# Patient Record
Sex: FEMALE | Race: ASIAN | NOT HISPANIC OR LATINO
[De-identification: names, ages, dates, MRNs, and addresses within clinical notes are randomized per-mention and may not be internally consistent; named-entity substitution may affect disease eponyms.]

---

## 2018-05-30 PROBLEM — Z00.00 ENCOUNTER FOR PREVENTIVE HEALTH EXAMINATION: Status: ACTIVE | Noted: 2018-05-30

## 2018-06-04 ENCOUNTER — APPOINTMENT (OUTPATIENT)
Dept: OBGYN | Facility: HOSPITAL | Age: 28
End: 2018-06-04

## 2018-06-08 ENCOUNTER — APPOINTMENT (OUTPATIENT)
Dept: INTERNAL MEDICINE | Facility: CLINIC | Age: 28
End: 2018-06-08

## 2019-03-24 ENCOUNTER — EMERGENCY (EMERGENCY)
Facility: HOSPITAL | Age: 29
LOS: 0 days | Discharge: HOME | End: 2019-03-24
Admitting: PHYSICIAN ASSISTANT

## 2019-03-24 VITALS
HEART RATE: 89 BPM | OXYGEN SATURATION: 99 % | RESPIRATION RATE: 18 BRPM | DIASTOLIC BLOOD PRESSURE: 64 MMHG | TEMPERATURE: 97 F | SYSTOLIC BLOOD PRESSURE: 117 MMHG

## 2019-03-24 VITALS
OXYGEN SATURATION: 99 % | HEART RATE: 75 BPM | DIASTOLIC BLOOD PRESSURE: 69 MMHG | RESPIRATION RATE: 18 BRPM | TEMPERATURE: 98 F | SYSTOLIC BLOOD PRESSURE: 112 MMHG

## 2019-03-24 DIAGNOSIS — R05 COUGH: ICD-10-CM

## 2019-03-24 DIAGNOSIS — J40 BRONCHITIS, NOT SPECIFIED AS ACUTE OR CHRONIC: ICD-10-CM

## 2019-03-24 RX ORDER — CLARITHROMYCIN 500 MG
1 TABLET ORAL
Qty: 1 | Refills: 0 | OUTPATIENT
Start: 2019-03-24 | End: 2019-03-28

## 2019-03-24 RX ORDER — ALBUTEROL 90 UG/1
1 AEROSOL, METERED ORAL ONCE
Qty: 0 | Refills: 0 | Status: COMPLETED | OUTPATIENT
Start: 2019-03-24 | End: 2019-03-24

## 2019-03-24 RX ADMIN — ALBUTEROL 1 PUFF(S): 90 AEROSOL, METERED ORAL at 19:50

## 2019-03-24 NOTE — ED PROVIDER NOTE - NSFOLLOWUPCLINICS_GEN_ALL_ED_FT
[facility]  Medicine  [address], [place]   Phone: [phone]  Fax:   Follow Up Time:

## 2019-03-24 NOTE — ED PROVIDER NOTE - OBJECTIVE STATEMENT
Patient c/o cough for 2 weeks, Had flu and now better. Cough continues, Dry , Feels some wheezing in left side. no fever, no SOB.

## 2019-03-24 NOTE — ED ADULT NURSE NOTE - OBJECTIVE STATEMENT
Patient was treated for flu 2 weeks ago. Coughing and congestion persist. Patient denies Fevers chills N/V/D adnominal pain and urinary symptoms.

## 2019-03-24 NOTE — ED PROVIDER NOTE - CLINICAL SUMMARY MEDICAL DECISION MAKING FREE TEXT BOX
Slight rhonchi left base with CXR showing atelectasis. No Fever, No SOB, Patient DC with MDI albuterol to use Q6 and Z pack

## 2019-03-24 NOTE — ED PROVIDER NOTE - NSFOLLOWUPINSTRUCTIONS_ED_ALL_ED_FT
Acute Bronchitis    Bronchitis is inflammation of the airways that extend from the windpipe into the lungs (bronchi). The inflammation often causes mucus to develop. This leads to a cough, which is the most common symptom of bronchitis.     In acute bronchitis, the condition usually develops suddenly and goes away over time, usually in a couple weeks. Smoking, allergies, and asthma can make bronchitis worse. Repeated episodes of bronchitis may cause further lung problems.     CAUSES  Acute bronchitis is most often caused by the same virus that causes a cold. The virus can spread from person to person (contagious) through coughing, sneezing, and touching contaminated objects.    SIGNS AND SYMPTOMS  Cough.    Fever.    Coughing up mucus.    Body aches.    Chest congestion.    Chills.    Shortness of breath.    Sore throat.      DIAGNOSIS  Acute bronchitis is usually diagnosed through a physical exam. Your health care provider will also ask you questions about your medical history. Tests, such as chest X-rays, are sometimes done to rule out other conditions.     TREATMENT  Acute bronchitis usually goes away in a couple weeks. Oftentimes, no medical treatment is necessary. Medicines are sometimes given for relief of fever or cough. Antibiotic medicines are usually not needed but may be prescribed in certain situations. In some cases, an inhaler may be recommended to help reduce shortness of breath and control the cough. A cool mist vaporizer may also be used to help thin bronchial secretions and make it easier to clear the chest.     HOME CARE INSTRUCTIONS  Get plenty of rest.    Drink enough fluids to keep your urine clear or pale yellow (unless you have a medical condition that requires fluid restriction). Increasing fluids may help thin your respiratory secretions (sputum) and reduce chest congestion, and it will prevent dehydration.    Take medicines only as directed by your health care provider.   If you were prescribed an antibiotic medicine, finish it all even if you start to feel better.   Avoid smoking and secondhand smoke. Exposure to cigarette smoke or irritating chemicals will make bronchitis worse. If you are a smoker, consider using nicotine gum or skin patches to help control withdrawal symptoms. Quitting smoking will help your lungs heal faster.    Reduce the chances of another bout of acute bronchitis by washing your hands frequently, avoiding people with cold symptoms, and trying not to touch your hands to your mouth, nose, or eyes.    Keep all follow-up visits as directed by your health care provider.      SEEK MEDICAL CARE IF:  Your symptoms do not improve after 1 week of treatment.     SEEK IMMEDIATE MEDICAL CARE IF:  You develop an increased fever or chills.    You have chest pain.    You have severe shortness of breath.  You have bloody sputum.     You develop dehydration.  You faint or repeatedly feel like you are going to pass out.  You develop repeated vomiting.  You develop a severe headache.     MAKE SURE YOU:  Understand these instructions.   Will watch your condition.  Will get help right away if you are not doing well or get worse.    ADDITIONAL NOTES AND INSTRUCTIONS    Please follow up with your Primary MD in 24-48 hr.  Seek immediate medical care for any new/worsening signs or symptoms.     Document Released: 1/25/2006 Document Revised: 1/8/2016 Document Reviewed: 6/10/2014  [employer] Interactive Patient Education ©2016 [employer] This information is not intended to replace advice given to you by your health care provider. Make sure you discuss any questions you have with your health care provider.

## 2019-03-26 ENCOUNTER — INPATIENT (INPATIENT)
Facility: HOSPITAL | Age: 29
LOS: 0 days | Discharge: HOME | End: 2019-03-27
Attending: HOSPITALIST | Admitting: HOSPITALIST

## 2019-03-26 VITALS
TEMPERATURE: 98 F | OXYGEN SATURATION: 100 % | HEART RATE: 87 BPM | SYSTOLIC BLOOD PRESSURE: 123 MMHG | DIASTOLIC BLOOD PRESSURE: 67 MMHG | RESPIRATION RATE: 16 BRPM

## 2019-03-26 LAB
ALBUMIN SERPL ELPH-MCNC: 4.3 G/DL — SIGNIFICANT CHANGE UP (ref 3.5–5.2)
ALP SERPL-CCNC: 54 U/L — SIGNIFICANT CHANGE UP (ref 30–115)
ALT FLD-CCNC: 16 U/L — SIGNIFICANT CHANGE UP (ref 0–41)
ANION GAP SERPL CALC-SCNC: 12 MMOL/L — SIGNIFICANT CHANGE UP (ref 7–14)
APPEARANCE UR: CLEAR — SIGNIFICANT CHANGE UP
AST SERPL-CCNC: 24 U/L — SIGNIFICANT CHANGE UP (ref 0–41)
BACTERIA # UR AUTO: ABNORMAL /HPF
BASOPHILS # BLD AUTO: 0.01 K/UL — SIGNIFICANT CHANGE UP (ref 0–0.2)
BASOPHILS NFR BLD AUTO: 0.2 % — SIGNIFICANT CHANGE UP (ref 0–1)
BILIRUB SERPL-MCNC: 0.3 MG/DL — SIGNIFICANT CHANGE UP (ref 0.2–1.2)
BILIRUB UR-MCNC: NEGATIVE — SIGNIFICANT CHANGE UP
BUN SERPL-MCNC: 9 MG/DL — LOW (ref 10–20)
CALCIUM SERPL-MCNC: 9.6 MG/DL — SIGNIFICANT CHANGE UP (ref 8.5–10.1)
CHLORIDE SERPL-SCNC: 104 MMOL/L — SIGNIFICANT CHANGE UP (ref 98–110)
CO2 SERPL-SCNC: 24 MMOL/L — SIGNIFICANT CHANGE UP (ref 17–32)
COLOR SPEC: YELLOW — SIGNIFICANT CHANGE UP
CREAT SERPL-MCNC: 0.6 MG/DL — LOW (ref 0.7–1.5)
DIFF PNL FLD: ABNORMAL
EOSINOPHIL # BLD AUTO: 0.04 K/UL — SIGNIFICANT CHANGE UP (ref 0–0.7)
EOSINOPHIL NFR BLD AUTO: 0.9 % — SIGNIFICANT CHANGE UP (ref 0–8)
EPI CELLS # UR: ABNORMAL /HPF
GLUCOSE SERPL-MCNC: 88 MG/DL — SIGNIFICANT CHANGE UP (ref 70–99)
GLUCOSE UR QL: NEGATIVE MG/DL — SIGNIFICANT CHANGE UP
HCG UR QL: NEGATIVE — SIGNIFICANT CHANGE UP
HCT VFR BLD CALC: 34.5 % — LOW (ref 37–47)
HGB BLD-MCNC: 11.3 G/DL — LOW (ref 12–16)
IMM GRANULOCYTES NFR BLD AUTO: 0.2 % — SIGNIFICANT CHANGE UP (ref 0.1–0.3)
KETONES UR-MCNC: NEGATIVE — SIGNIFICANT CHANGE UP
LACTATE SERPL-SCNC: 1.1 MMOL/L — SIGNIFICANT CHANGE UP (ref 0.5–2.2)
LEUKOCYTE ESTERASE UR-ACNC: ABNORMAL
LYMPHOCYTES # BLD AUTO: 1.44 K/UL — SIGNIFICANT CHANGE UP (ref 1.2–3.4)
LYMPHOCYTES # BLD AUTO: 31.6 % — SIGNIFICANT CHANGE UP (ref 20.5–51.1)
MAGNESIUM SERPL-MCNC: 2.1 MG/DL — SIGNIFICANT CHANGE UP (ref 1.8–2.4)
MCHC RBC-ENTMCNC: 27 PG — SIGNIFICANT CHANGE UP (ref 27–31)
MCHC RBC-ENTMCNC: 32.8 G/DL — SIGNIFICANT CHANGE UP (ref 32–37)
MCV RBC AUTO: 82.3 FL — SIGNIFICANT CHANGE UP (ref 81–99)
MONOCYTES # BLD AUTO: 0.13 K/UL — SIGNIFICANT CHANGE UP (ref 0.1–0.6)
MONOCYTES NFR BLD AUTO: 2.9 % — SIGNIFICANT CHANGE UP (ref 1.7–9.3)
NEUTROPHILS # BLD AUTO: 2.93 K/UL — SIGNIFICANT CHANGE UP (ref 1.4–6.5)
NEUTROPHILS NFR BLD AUTO: 64.2 % — SIGNIFICANT CHANGE UP (ref 42.2–75.2)
NITRITE UR-MCNC: NEGATIVE — SIGNIFICANT CHANGE UP
NRBC # BLD: 0 /100 WBCS — SIGNIFICANT CHANGE UP (ref 0–0)
PH UR: 6.5 — SIGNIFICANT CHANGE UP (ref 5–8)
PLATELET # BLD AUTO: 327 K/UL — SIGNIFICANT CHANGE UP (ref 130–400)
POTASSIUM SERPL-MCNC: 4.5 MMOL/L — SIGNIFICANT CHANGE UP (ref 3.5–5)
POTASSIUM SERPL-SCNC: 4.5 MMOL/L — SIGNIFICANT CHANGE UP (ref 3.5–5)
PROT SERPL-MCNC: 8.2 G/DL — HIGH (ref 6–8)
PROT UR-MCNC: NEGATIVE MG/DL — SIGNIFICANT CHANGE UP
RBC # BLD: 4.19 M/UL — LOW (ref 4.2–5.4)
RBC # FLD: 13.7 % — SIGNIFICANT CHANGE UP (ref 11.5–14.5)
RBC CASTS # UR COMP ASSIST: ABNORMAL /HPF
SODIUM SERPL-SCNC: 140 MMOL/L — SIGNIFICANT CHANGE UP (ref 135–146)
SP GR SPEC: 1.01 — SIGNIFICANT CHANGE UP (ref 1.01–1.03)
UROBILINOGEN FLD QL: 0.2 MG/DL — SIGNIFICANT CHANGE UP (ref 0.2–0.2)
WBC # BLD: 4.56 K/UL — LOW (ref 4.8–10.8)
WBC # FLD AUTO: 4.56 K/UL — LOW (ref 4.8–10.8)
WBC UR QL: SIGNIFICANT CHANGE UP /HPF

## 2019-03-26 RX ORDER — TUBERCULIN PURIFIED PROTEIN DERIVATIVE 5 [IU]/.1ML
5 INJECTION, SOLUTION INTRADERMAL ONCE
Qty: 0 | Refills: 0 | Status: COMPLETED | OUTPATIENT
Start: 2019-03-26 | End: 2019-03-26

## 2019-03-26 RX ADMIN — TUBERCULIN PURIFIED PROTEIN DERIVATIVE 5 UNIT(S): 5 INJECTION, SOLUTION INTRADERMAL at 23:45

## 2019-03-26 NOTE — H&P ADULT - ASSESSMENT
28 F from [place], went to  for cough and was d/luciana with abx, her CXR official read came back suggestive for cavitary lesion and she was asked to go to ED to r/o TB. Denies weight loss, hemoptysis, night sweats.   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   she is koki admitted for high suspicion of TB.     1- 28 F from [place], went to  for cough and was d/luciana with abx, her CXR official read came back suggestive for cavitary lesion and she was asked to go to ED to r/o TB. Denies weight loss, hemoptysis, night sweats.   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   she is koki admitted for high suspicion of TB.     1- r/o Active TB  dry cough, no sputum  cxr- Left upper lobe cavitary lesion, left lower lobe opacity, left inferior   lateral pleural reaction, left midlung calcified granuloma    ID consult  PPD and quantiferon test  sputum cx induced  3 specimens  airborne precautions    2- DVT ppx- encouarge ambulate   reg diet  from home 28 F from [place], went to  for cough and was d/luciana with abx, her CXR official read came back suggestive for cavitary lesion and she was asked to go to ED to r/o TB. Denies weight loss, hemoptysis, night sweats.   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   she is koki admitted for high suspicion of TB.     1- r/o Active TB  dry cough, no sputum  cxr- Left upper lobe cavitary lesion, left lower lobe opacity, left inferior   lateral pleural reaction, left midlung calcified granuloma    ID consult  PPD  sputum cx induced  3 specimens  airborne precautions    2- DVT ppx- encouarge ambulate   reg diet  from home

## 2019-03-26 NOTE — H&P ADULT - NSHPPHYSICALEXAM_GEN_ALL_CORE
T(F): 98.6 (03-26-19 @ 16:42), Max: 98.6 (03-26-19 @ 16:42)  HR: 73 (03-26-19 @ 16:42) (73 - 87)  BP: 115/78 (03-26-19 @ 16:42) (115/78 - 123/67)  RR: 17 (03-26-19 @ 16:42) (16 - 17)  SpO2: 100% (03-26-19 @ 16:42) (100% - 100%)    PHYSICAL EXAM:  GEN: No acute distress  HEENT:   LUNGS: Clear to auscultation bilaterally   HEART: S1/S2 present. RRR.   ABD: Soft, non-tender, non-distended. Bowel sounds present  EXT:  NEURO: AAOX3 T(F): 98.6 (03-26-19 @ 16:42), Max: 98.6 (03-26-19 @ 16:42)  HR: 73 (03-26-19 @ 16:42) (73 - 87)  BP: 115/78 (03-26-19 @ 16:42) (115/78 - 123/67)  RR: 17 (03-26-19 @ 16:42) (16 - 17)  SpO2: 100% (03-26-19 @ 16:42) (100% - 100%)    PHYSICAL EXAM:  GEN: No acute distress  HEENT: wnl  LUNGS: Clear to auscultation bilaterally   HEART: S1/S2 present. RRR.   ABD: Soft, non-tender, non-distended.   EXT: no edema  NEURO: AAOX3

## 2019-03-26 NOTE — H&P ADULT - ATTENDING COMMENTS
Patient seen and examined independently. I agree with the resident's note, physical exam, and plan except as below.  Vital Signs Last 24 Hrs  T(C): 36.4 (27 Mar 2019 09:00), Max: 37.1 (26 Mar 2019 20:01)  T(F): 97.6 (27 Mar 2019 09:00), Max: 98.7 (26 Mar 2019 20:01)  HR: 83 (27 Mar 2019 09:00) (73 - 85)  BP: 91/52 (27 Mar 2019 09:00) (91/52 - 115/78)  BP(mean): --  RR: 18 (27 Mar 2019 09:00) (17 - 18)  SpO2: 99% (27 Mar 2019 09:00) (99% - 100%)  Pe  nad  aaox3  [id number]  ctabl  soft ntnd+bs  no cce    #dry cough - likely viral URI - +sick contact - boyfriend  - supportive treatment   #questionable left upper lobe cavitary lesion - possible scar tissue - will need outpt CT chest  no clinical signs of TB - seen by ID - Dr Jewell - agree, no PNA  stable to dc home  denies pmd - [facility] appt made apr 4th @ 1230pm for further follow up

## 2019-03-26 NOTE — ED ADULT NURSE NOTE - OBJECTIVE STATEMENT
Patient with cough x2 weeks, called back by provider for results of chest xray R/O TB. pt AA0x4, no sign sof aucte distress, no complaints at this time.

## 2019-03-26 NOTE — ED PROVIDER NOTE - CLINICAL SUMMARY MEDICAL DECISION MAKING FREE TEXT BOX
27 yo woman with very minor symptoms which seem more likely due to URI with STEPHANIE cavitary lesion of unknown duration without active TB symptoms.  But patient is relatively high risk for TB with her history and hence will be admitted for eval by ID and pulmonology.  Airborne isolation.

## 2019-03-26 NOTE — ED ADULT NURSE NOTE - NSIMPLEMENTINTERV_GEN_ALL_ED
Implemented All Universal Safety Interventions:  [place] to call system. Call bell, personal items and telephone within reach. Instruct patient to call for assistance. Room bathroom lighting operational. Non-slip footwear when patient is off stretcher. Physically safe environment: no spills, clutter or unnecessary equipment. Stretcher in lowest position, wheels locked, appropriate side rails in place.

## 2019-03-26 NOTE — ED PROVIDER NOTE - NS ED ROS FT
GENERAL: Denies fever/chills, loss of appetite/weight or fatigue  SKIN: Cristina rashes, abrasions, lacerations, ecchymosis, erythema, or edema.  HEAD: Denies headache, dizziness or trauma  EYES: Denies blurry vision, diplopia, or photophobia  ENT: Denies earaches, discharge or hearing loss. Denies nasal discharge or epistaxis. Denies sore throat.   CARDIAC: Denies chest pain, palpitations, or SOB.   RESPIRATORY: Denies SOB, hemoptysis or wheezing.   GI: Denies abdominal pain, n/v/d, bloody stools or melena.   : Denies vaginal bleeding/discharge or pelvic pain.  MSK: Denies myalgias, bony deformity or pain.   NEURO: Denies numbness, tingling, weakness.

## 2019-03-26 NOTE — ED POST DISCHARGE NOTE - RESULT SUMMARY
CXR- LEFT UPPER CAVITARY LESION NOTED. OBS MARSHA SOUTH NOTIFIED, I TOLD HER TO CALL CHARGE RN FOR ISOLATION PRECAUTION WHEN PATIENT GETS TO ED TODAY.

## 2019-03-26 NOTE — H&P ADULT - NSHPLABSRESULTS_GEN_ALL_CORE
11.3   4.56  )-----------( 327      ( 26 Mar 2019 15:00 )             34.5       03-26    140  |  104  |  9<L>  ----------------------------<  88  4.5   |  24  |  0.6<L>    Ca    9.6      26 Mar 2019 15:00  Mg     2.1     03-26    TPro  8.2<H>  /  Alb  4.3  /  TBili  0.3  /  DBili  x   /  AST  24  /  ALT  16  /  AlkPhos  54  03-26          Lactate Trend  03-26 @ 15:00 Lactate:1.1

## 2019-03-26 NOTE — ED ADULT TRIAGE NOTE - CHIEF COMPLAINT QUOTE
as per boyfriend, "she was here a couple of days ago and someone called us to come back because they saw something on her x-ray" cough x2 weeks

## 2019-03-26 NOTE — H&P ADULT - HISTORY OF PRESENT ILLNESS
28 F from [place], went to  for cough and was d/luciana with abx, her CXR official read came back suggestive for cavitary lesion and she was asked to go to ED to r/o TB. Denies weight loss, hemoptysis, night sweats.   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   she is koki admitted for high suspicion of TB. 28 F from [place], p/w  mild dry cough for 2 weeks, she went to PMD 2 weeks back and was given abx , however her cough was stable and did not improve and she came to UC and CXR official read was suggestive for cavitary lesion and she was asked to go to ED to r/o TB. Denies weight loss, hemoptysis, night sweats.   patient had h/o TB 7 years back in [place] for which she received treatment with medications for 9 months. at that time, she had severe cough and her cxr was also abnormal.     she moved to [place] 6 years back and to  3 years back. she reports that before she moved to [place] , she did have abn xray findings on cxr but was told by a doctor that she may always have some cxr abn due to h/o TB.     lives with boyfriend and her 2 year old son.

## 2019-03-26 NOTE — ED ADULT NURSE REASSESSMENT NOTE - NS ED NURSE REASSESS COMMENT FT1
received pt from previous RN; pt pending placement of ppd. respiratory notified. pt aox4, denies pain / discomfort. vs as noted. attempted to call MAR at [phone] to make aware of bp, at this time unable to respond to call. pt denies dizziness/ headache/sob.  will continue to monitor / assess

## 2019-03-26 NOTE — ED PROVIDER NOTE - OBJECTIVE STATEMENT
27 yo woman with recent URI presented to [facility] for this.  CXRAY was done and patient was D/C'ed home with reassurance and she has been taking azithromycin.  Radiology noted a STEPHANIE cavitary lesion however on official read.  Patient was called to return to ED to r/o TB.  Patient denies fever, hemoptysis, night sweats, chills, weight loss.  She has had TB about 8 years prior in [place] and was treated with 9 months of an antibiotic she does not recall the name.  She denies any history of pulmonary TB however and states that she was treated due to an abnormal PPD.  No SOB or chest pain.  She has been in this country for about 3 years but was in [place] prior to that.

## 2019-03-27 ENCOUNTER — TRANSCRIPTION ENCOUNTER (OUTPATIENT)
Age: 29
End: 2019-03-27

## 2019-03-27 VITALS
OXYGEN SATURATION: 99 % | DIASTOLIC BLOOD PRESSURE: 63 MMHG | TEMPERATURE: 98 F | HEART RATE: 72 BPM | SYSTOLIC BLOOD PRESSURE: 93 MMHG | RESPIRATION RATE: 18 BRPM

## 2019-03-27 PROBLEM — Z78.9 OTHER SPECIFIED HEALTH STATUS: Chronic | Status: INACTIVE | Noted: 2019-03-24 | Resolved: 2019-03-26

## 2019-03-27 LAB
ANION GAP SERPL CALC-SCNC: 8 MMOL/L — SIGNIFICANT CHANGE UP (ref 7–14)
BUN SERPL-MCNC: 11 MG/DL — SIGNIFICANT CHANGE UP (ref 10–20)
CALCIUM SERPL-MCNC: 9.4 MG/DL — SIGNIFICANT CHANGE UP (ref 8.5–10.1)
CHLORIDE SERPL-SCNC: 106 MMOL/L — SIGNIFICANT CHANGE UP (ref 98–110)
CO2 SERPL-SCNC: 25 MMOL/L — SIGNIFICANT CHANGE UP (ref 17–32)
CREAT SERPL-MCNC: 0.7 MG/DL — SIGNIFICANT CHANGE UP (ref 0.7–1.5)
GLUCOSE SERPL-MCNC: 92 MG/DL — SIGNIFICANT CHANGE UP (ref 70–99)
HCT VFR BLD CALC: 34.4 % — LOW (ref 37–47)
HGB BLD-MCNC: 10.9 G/DL — LOW (ref 12–16)
MCHC RBC-ENTMCNC: 26.1 PG — LOW (ref 27–31)
MCHC RBC-ENTMCNC: 31.7 G/DL — LOW (ref 32–37)
MCV RBC AUTO: 82.5 FL — SIGNIFICANT CHANGE UP (ref 81–99)
NRBC # BLD: 0 /100 WBCS — SIGNIFICANT CHANGE UP (ref 0–0)
PLATELET # BLD AUTO: 339 K/UL — SIGNIFICANT CHANGE UP (ref 130–400)
POTASSIUM SERPL-MCNC: 4.6 MMOL/L — SIGNIFICANT CHANGE UP (ref 3.5–5)
POTASSIUM SERPL-SCNC: 4.6 MMOL/L — SIGNIFICANT CHANGE UP (ref 3.5–5)
RBC # BLD: 4.17 M/UL — LOW (ref 4.2–5.4)
RBC # FLD: 13.8 % — SIGNIFICANT CHANGE UP (ref 11.5–14.5)
SODIUM SERPL-SCNC: 139 MMOL/L — SIGNIFICANT CHANGE UP (ref 135–146)
WBC # BLD: 3.49 K/UL — LOW (ref 4.8–10.8)
WBC # FLD AUTO: 3.49 K/UL — LOW (ref 4.8–10.8)

## 2019-03-27 NOTE — CONSULT NOTE ADULT - ASSESSMENT
28 F from [place], p/w  mild dry cough for 2 weeks, she went to PMD 2 weeks back and was given abx , however her cough was stable and did not improve and she came to UC and CXR official read was suggestive for cavitary lesion and she was asked to go to ED to r/o TB. Denies weight loss, hemoptysis, night sweats.   ID was consulted for dry cough with hx of latent TB  Pt has dry cough and denies any SOB, fevers, night sweats, chills, n/v/d, urine changes, changes in BM, hemoptysis, weight loss, rash. Pt is a low risk of reactivation of TB, AFB cultures are ordered. UA was negative. 2 week history of being treated with Bactrim as per pt. No evidence of pneumonia on CXR.    IMPRESSION:  No active infection, Latent TB.    RECOMMENDATION:  Start INH for 9 months   Start B6 with INH  f/u AFB cultures x3  f/u PPD testing 28 F from [place], p/w  mild dry cough for 2 weeks, she went to PMD 2 weeks back and was given abx , however her cough was stable and did not improve and she came to UC and CXR official read was suggestive for cavitary lesion and she was asked to go to ED to r/o TB. Denies weight loss, hemoptysis, night sweats.   ID was consulted for dry cough with hx of latent TB  Pt has dry cough and denies any SOB, fevers, night sweats, chills, n/v/d, urine changes, changes in BM, hemoptysis, weight loss, rash. Pt is a low risk of reactivation of TB, AFB cultures are ordered. UA was negative. 2 week history of being treated with Bactrim as per pt. No evidence of pneumonia on CXR.    IMPRESSION:  No active Tb symptoms, patient is clinically stable.     RECOMMENDATION:  D/c AF cultures  D/c Home. 28 F from [place], prior treatment for cavitary TB with 9 mn tx,  p/w  mild dry cough for 2 weeks, she went to PMD 2 weeks back and was given abx , however her cough was stable and did not improve and she came to UC and CXR official read was suggestive for cavitary lesion and she was asked to go to ED to r/o TB. Denies weight loss, hemoptysis, night sweats.   ID was consulted for dry cough with hx of latent TB  Pt has dry cough and denies any SOB, fevers, night sweats, chills, n/v/d, urine changes, changes in BM, hemoptysis, weight loss, rash. Pt is a low risk of reactivation of TB, AFB cultures are ordered. UA was negative. 2 week history of being treated with Bactrim as per pt. No evidence of pneumonia on CXR, old changes  Sepsis ruled out on admission      IMPRESSION:  No active Tb symptoms, patient is clinically stable.     RECOMMENDATION:  D/c AFB cultures  Monitor off abx

## 2019-03-27 NOTE — DISCHARGE NOTE PROVIDER - HOSPITAL COURSE
28 F from [place], p/w  mild dry cough for 2 weeks, she went to PMD 2 weeks back and was given abx , however her cough was stable and did not improve and she came to UC and CXR official read was suggestive for cavitary lesion and she was asked to go to ED to r/o TB. Patient had h/o TB 7 years back in [place] for which she received treatment with medications for 9 months.        Patient was admitted to medicine for further work up. She was seen by ID and tb was ruled out. Patient was diagnosed as URTI (likely viral). She was managed symptomatically with improved symptoms. She was discharged home and advised to follow up with [facility] appt made April 4th @ 1230pm for further management.

## 2019-03-27 NOTE — CONSULT NOTE ADULT - SUBJECTIVE AND OBJECTIVE BOX
QUEENMORGAN  28y, Female  Allergy: No Known Allergies      HPI:  28 F from [place], p/w  mild dry cough for 2 weeks, she went to PMD 2 weeks back and was given abx , however her cough was stable and did not improve and she came to  and CXR official read was suggestive for cavitary lesion and she was asked to go to ED to r/o TB. Denies weight loss, hemoptysis, night sweats.   patient had h/o TB 7 years back in [place] for which she received treatment with medications for 9 months. at that time, she had severe cough and her cxr was also abnormal.     she moved to [place] 6 years back and to  3 years back. she reports that before she moved to [place] , she did have abn xray findings on cxr but was told by a doctor that she may always have some cxr abn due to h/o TB.     lives with boyfriend and her 2 year old son. (26 Mar 2019 18:28)    Pt was seen at bedside this morning. Pt was in no acute distress. Pt reports she had a bacterial infection, says it was a flu, 2 weeks ago that she caught from her  and brother in law. Pt states she had sore throat, fever, cough and rhinorrhea for 2 weeks and was treated with Bactrim for 10 days by her doctor. Pt denies any SOB, fevers, night sweats, chills, n/v/d, urine changes, changes in BM, hemoptysis, weight loss, rash. She reports she knows this cough is different than her TB cough which is worse. Her last PPD was 7years ago in [place] and she was treated with 4 different drugs for 9 months, does not remember the names. Pt does not remember if she received the BCG vaccine as a child.     FAMILY HISTORY:  No pertinent family history in first degree relatives    PAST MEDICAL & SURGICAL HISTORY:  No pertinent past medical history  No significant past surgical history      ROS negative except as per HPI    VITALS:  T(F): 97.6, Max: 98.7 (19 @ 20:01)  HR: 83  BP: 91/52  RR: 18Vital Signs Last 24 Hrs  T(C): 36.4 (27 Mar 2019 09:00), Max: 37.1 (26 Mar 2019 20:01)  T(F): 97.6 (27 Mar 2019 09:00), Max: 98.7 (26 Mar 2019 20:01)  HR: 83 (27 Mar 2019 09:00) (73 - 87)  BP: 91/52 (27 Mar 2019 09:00) (91/52 - 123/67)  BP(mean): --  RR: 18 (27 Mar 2019 09:00) (16 - 18)  SpO2: 99% (27 Mar 2019 09:00) (99% - 100%)    PHYSICAL EXAM:  General: NAD, cooperative  HEENT: atrumatic, normocephalic  Lungs: clear to auscultation b/l, no wheezes heard  Heart: s1 and s2 present, no murmurs noted  Abdomen: soft, non tender, non distended  Extremities: no edema noted in b/l LE  Neuro: AAOx3    TESTS & MEASUREMENTS:                        10.9   3.49  )-----------( 339      ( 27 Mar 2019 07:47 )             34.4         139  |  106  |  11  ----------------------------<  92  4.6   |  25  |  0.7    Ca    9.4      27 Mar 2019 07:47  Mg     2.1         TPro  8.2<H>  /  Alb  4.3  /  TBili  0.3  /  DBili  x   /  AST  24  /  ALT  16  /  AlkPhos  54  -26    LIVER FUNCTIONS - ( 26 Mar 2019 15:00 )  Alb: 4.3 g/dL / Pro: 8.2 g/dL / ALK PHOS: 54 U/L / ALT: 16 U/L / AST: 24 U/L / GGT: x             Urinalysis Basic - ( 26 Mar 2019 19:55 )    Color: Yellow / Appearance: Clear / S.010 / pH: x  Gluc: x / Ketone: Negative  / Bili: Negative / Urobili: 0.2 mg/dL   Blood: x / Protein: Negative mg/dL / Nitrite: Negative   Leuk Esterase: Small / RBC: 3-5 /HPF / WBC 3-5 /HPF   Sq Epi: x / Non Sq Epi: Occasional /HPF / Bacteria: Few /HPF    RADIOLOGY & ADDITIONAL TESTS:    < from: Xray Chest 2 Views PA/Lat (19 @ 19:39) >  Left upper lobe cavitary lesion, left lower lobe opacity, left inferior   lateral pleural reaction, left midlung calcified granuloma    < end of copied text >      ANTIBIOTICS:

## 2019-03-27 NOTE — DISCHARGE NOTE PROVIDER - CARE PROVIDER_API CALL
Michelle Akers)  Geriatric Medicine; Internal Medicine  [address] [place] [address] [id number]  Phone: [phone]  Fax: [phone]  Follow Up Time:

## 2019-03-27 NOTE — PROGRESS NOTE ADULT - SUBJECTIVE AND OBJECTIVE BOX
SUBJECTIVE:    Patient is a 28y old Female who presents with a chief complaint of cough and cavitary lesion on imaging, h/o latent tb in the past (27 Mar 2019 10:42)    Currently admitted to medicine with the primary diagnosis of Cavitary lesion of lung     Today is hospital day 1d. This morning she is resting comfortably in bed and reports no new issues or overnight events.     PAST MEDICAL & SURGICAL HISTORY  No pertinent past medical history  No significant past surgical history    SOCIAL HISTORY:  Negative for smoking/alcohol/drug use.     ALLERGIES:  No Known Allergies    MEDICATIONS:  STANDING MEDICATIONS    PRN MEDICATIONS    VITALS:   T(F): 98.4  HR: 72  BP: 93/63  RR: 18  SpO2: 99%    LABS:                        10.9   3.49  )-----------( 339      ( 27 Mar 2019 07:47 )             34.4         139  |  106  |  11  ----------------------------<  92  4.6   |  25  |  0.7    Ca    9.4      27 Mar 2019 07:47  Mg     2.1         TPro  8.2<H>  /  Alb  4.3  /  TBili  0.3  /  DBili  x   /  AST  24  /  ALT  16  /  AlkPhos  54        Urinalysis Basic - ( 26 Mar 2019 19:55 )    Color: Yellow / Appearance: Clear / S.010 / pH: x  Gluc: x / Ketone: Negative  / Bili: Negative / Urobili: 0.2 mg/dL   Blood: x / Protein: Negative mg/dL / Nitrite: Negative   Leuk Esterase: Small / RBC: 3-5 /HPF / WBC 3-5 /HPF   Sq Epi: x / Non Sq Epi: Occasional /HPF / Bacteria: Few /HPF      RADIOLOGY:  Xray Chest 2 Views PA/Lat (19)  Left upper lobe cavitary lesion, left lower lobe opacity, left inferior   lateral pleural reaction, left midlung calcified granuloma      PHYSICAL EXAM:  GEN: No acute distress  LUNGS: Clear to auscultation bilaterally   HEART: S1/S2 present. RRR.   ABD: Soft, non-tender, non-distended. Bowel sounds present  EXT: NC/NC/NE/2+PP/RADER  NEURO: AAOX3

## 2019-03-27 NOTE — DISCHARGE NOTE PROVIDER - NSDCCPCAREPLAN_GEN_ALL_CORE_FT
PRINCIPAL DISCHARGE DIAGNOSIS  Diagnosis: URTI (acute upper respiratory infection)  Assessment and Plan of Treatment: likely viral  better today   symptomatic treatment

## 2019-03-27 NOTE — DISCHARGE NOTE NURSING/CASE MANAGEMENT/SOCIAL WORK - NSDCDPATPORTLINK_GEN_ALL_CORE
You can access the [facility] Patient Portal, offered by [facility], by registering with the following website: [email or web address]

## 2019-03-27 NOTE — PROGRESS NOTE ADULT - ASSESSMENT
28 F from [place], went to UC for cough and was d/luciana with abx, her CXR official read came back suggestive for cavitary lesion and she was asked to go to ED to r/o TB.    # URTI likely viral  dry cough, no sputum  cxr: Left upper lobe cavitary lesion, left lower lobe opacity, left inferior lateral pleural reaction, left midlung calcified granuloma  ID consult: symptomatic management   discontinue isolation     # DVT ppx  - encourage ambulate     # Regular Diet    # Discharge to home today

## 2019-03-28 PROBLEM — Z78.9 OTHER SPECIFIED HEALTH STATUS: Chronic | Status: ACTIVE | Noted: 2019-03-26

## 2019-03-29 DIAGNOSIS — R05 COUGH: ICD-10-CM

## 2019-03-29 DIAGNOSIS — J06.9 ACUTE UPPER RESPIRATORY INFECTION, UNSPECIFIED: ICD-10-CM

## 2019-03-29 DIAGNOSIS — Z86.11 PERSONAL HISTORY OF TUBERCULOSIS: ICD-10-CM

## 2019-04-04 ENCOUNTER — APPOINTMENT (OUTPATIENT)
Dept: INTERNAL MEDICINE | Facility: CLINIC | Age: 29
End: 2019-04-04

## 2019-04-09 ENCOUNTER — TRANSCRIPTION ENCOUNTER (OUTPATIENT)
Age: 29
End: 2019-04-09

## 2019-08-01 NOTE — ED ADULT NURSE NOTE - NS ED NOTE ABUSE RESPONSE YN
Sinus Headache    The sinuses are air-filled spaces within the bones of the face. They connect to the inside of the nose. Sinusitis is an inflammation of the tissue lining the sinus cavity. Sinus inflammation can occur during a cold or hay fever (allergies to pollens and other particles in the air) and cause symptoms of sinus congestion and fullness and perhaps a low-grade fever. An infection is usually present when there is also facial pain or headache and green or yellow drainage from the nose or into the back of the throat (postnasal drip). Antibiotics are often prescribed to treat this condition.  Sinus headache may cause pain in different places, depending on which sinuses are infected. There may be pain in the temples, forehead, top of the head, behind or around the eye, across the cheekbone, or into the upper teeth.  You may find that changing your position, sitting upright or lying down, will bring some relief.  Home care  The following guidelines will help you care for yourself at home:  · Drink plenty of water, hot tea, and other liquids to stay well hydrated. This thins the mucus and helps your sinuses drain.  · Apply heat to the painful areas of the face. Use a towel soaked in hot water. Or  the shower with the hot spray on your face. This is a good way to inhale warm water vapor and get heat on your face at the same time. Cover your mouth and nose with your hands so you can still breathe as you do this.  · Use a cool mist vaporizer at night. Suck on peppermint, menthol, or eucalyptus hard candies during the day.  · An expectorant containing guaifenesin helps thin the mucus. It also helps your sinuses drain.  · You may use over-the-counter decongestants unless a similar medicine was prescribed. Nasal sprays or drops work the fastest. Use one that contains phenylephrine or oxymetazoline. First blow your nose gently to remove mucus. Then apply the spray or drops. Don't use decongestant nasal  sprays or drops more often than the label says or for more than 3 days. This can make symptoms worse. Nasal sprays or drops prescribed by your doctor typically do not have these limits. Check with your doctor or pharmacist. You may also use oral tablets containing pseudoephedrine. Side effects from oral decongestants tend to be worse than with nasal sprays or drops, and may keep you from using them. Many sinus remedies combine ingredients, which may increase side effects. Also, if you are taking a combination medicine with another medicine, be sure you are not taking a double dose of anything by mistake. Read the labels or ask the pharmacist for help. Talk with your doctor before using decongestants if you have high blood pressure, heart disease, glaucoma, or prostate trouble.  · Antihistamines may help if allergies are causing your sinusitis. You can get chlorpheniramine and diphenhydramine over the counter, but these can cause drowsiness. Don't use these if you have glaucoma or if you are a man with trouble urinating due to an enlarged prostate. Over-the-counter antihistamines containing loratidine and cetirizine cause less drowsiness and may be a better choice for daytime use.  · When allergies cause your sinusitis, a saline nasal rinse may give relief. A saline nasal rinse reduces swelling and clears excess mucus. This allows sinuses to drain. Prepackaged kits are available at most drugstores. These contain premixed salt packets and an irrigation device. If antibiotics have been prescribed to treat an acute sinus infection, talk with your doctor before using a nasal rinse to be sure it is safe for you.  · You may use over-the-counter medicine to control pain and fever, unless another pain medicine was prescribed. Talk with your doctor before using acetaminophen or ibuprofen if you have chronic liver or kidney disease. Also talk with your doctor if you have ever had a stomach ulcer. Aspirin should never be used  in anyone under 18 years of age who has a fever. It may cause a life-threatening condition called Reye syndrome.  · If antibiotics were given, finish all of them, even if you are feeling better after a few days.  Follow-up care  Follow up with your healthcare provider, or as advised if your symptoms aren't better in 1 week.  Call 911  Call 911 if any of these occur:  · Unusual drowsiness or confusion  · Swelling of the forehead or eyelids  · Vision problems including blurred or double vision  · Seizure  When to seek medical advice  Call your healthcare provider right away if any of these occur:  · Facial pain or headache becomes more severe  · Stiff neck  · Fever of 100.4º F (38º C) or higher, or as directed by your healthcare provider  · Bleeding from the nose or throat  Date Last Reviewed: 10/1/2016  © 8760-2342 "[employer]". [address] [place] [address]. All rights reserved. This information is not intended as a substitute for professional medical care. Always follow your healthcare professional's instructions.            Earwax, Home Treatment    Everyone produces earwax from the lining of the ear canal. It serves to lubricate and protect the ear. The wax that forms in the canal naturally moves toward the outside of the ear and falls out. Sometimes the ear canal may contain too much wax. This can cause a blockage and loss of hearing. Directions are given below for home treatment.  Home care  If your doctor has advised you to remove a wax blockage yourself, follow these directions:  · Unless a medicine was prescribed, you may use an over-the-counter product made for clearing earwax. These contain carbamide peroxide. Lie down with the blocked ear facing upward. Apply one dropper full of medicine and wait a few minutes. Grasp the outer ear and wiggle it to help the solution enter the canal.  · Lean over a sink or basin with the blocked ear facing downward. Use a bulb syringe filled with  warm (not hot or cold) water to rinse the ear several times. Use gentle pressure only.  · If you are having trouble draining the water out of your ear canal, put a few drops of rubbing alcohol (isopropyl alcohol) into the ear canal. This will help remove the remaining water.  · Repeat this procedure once a day for up to three days, or until your hearing is back to normal. Do not use this treatment for more than three days in a row.  Don’ts  · Don’t use cold water to rinse the ear. This will make you dizzy.  · Don’t perform this procedure if you have an ear infection.  · Don’t perform this procedure if you have a ruptured eardrum.  · Don’t use cotton swabs, matches, hairpins, keys, or other objects to “clean” the ear canal. This can cause infection of the ear canal or rupture the eardrum. Because of their size and shape, cotton swabs can push earwax deeper into the ear canal instead of removing it.  Follow-up care  Follow up with your health care provider if you are not improving after three cleaning attempts, or as advised.  When to seek medical advice  Call your health care provider right away if any of these occur:  · Worsening ear pain  · Fever of 101°F (38.3°C) or higher, or as directed by your health care provider  · Hearing does not return to normal after three days of treatment  · Fluid drainage or bleeding from the ear canal  · Swelling, redness, or tenderness of the outer ear  · Headache, neck pain, or stiff neck  © 9376-8173 [employer]. [address] [place] [address], [place] [address]. All rights reserved. This information is not intended as a substitute for professional medical care. Always follow your healthcare professional's instructions.         Yes

## 2023-01-17 ENCOUNTER — EMERGENCY (EMERGENCY)
Facility: HOSPITAL | Age: 33
LOS: 0 days | Discharge: HOME | End: 2023-01-17
Attending: EMERGENCY MEDICINE | Admitting: EMERGENCY MEDICINE
Payer: MEDICAID

## 2023-01-17 VITALS
RESPIRATION RATE: 18 BRPM | DIASTOLIC BLOOD PRESSURE: 65 MMHG | HEART RATE: 75 BPM | SYSTOLIC BLOOD PRESSURE: 107 MMHG | TEMPERATURE: 98 F | OXYGEN SATURATION: 100 %

## 2023-01-17 VITALS
SYSTOLIC BLOOD PRESSURE: 110 MMHG | OXYGEN SATURATION: 100 % | DIASTOLIC BLOOD PRESSURE: 62 MMHG | RESPIRATION RATE: 18 BRPM | HEART RATE: 70 BPM

## 2023-01-17 DIAGNOSIS — Z3A.01 LESS THAN 8 WEEKS GESTATION OF PREGNANCY: ICD-10-CM

## 2023-01-17 DIAGNOSIS — O20.9 HEMORRHAGE IN EARLY PREGNANCY, UNSPECIFIED: ICD-10-CM

## 2023-01-17 LAB
ALBUMIN SERPL ELPH-MCNC: 4.4 G/DL — SIGNIFICANT CHANGE UP (ref 3.5–5.2)
ALP SERPL-CCNC: 38 U/L — SIGNIFICANT CHANGE UP (ref 30–115)
ALT FLD-CCNC: 10 U/L — SIGNIFICANT CHANGE UP (ref 0–41)
ANION GAP SERPL CALC-SCNC: 10 MMOL/L — SIGNIFICANT CHANGE UP (ref 7–14)
APPEARANCE UR: CLEAR — SIGNIFICANT CHANGE UP
AST SERPL-CCNC: 14 U/L — SIGNIFICANT CHANGE UP (ref 0–41)
BACTERIA # UR AUTO: ABNORMAL
BASOPHILS # BLD AUTO: 0.01 K/UL — SIGNIFICANT CHANGE UP (ref 0–0.2)
BASOPHILS NFR BLD AUTO: 0.3 % — SIGNIFICANT CHANGE UP (ref 0–1)
BILIRUB DIRECT SERPL-MCNC: <0.2 MG/DL — SIGNIFICANT CHANGE UP (ref 0–0.3)
BILIRUB INDIRECT FLD-MCNC: >0.3 MG/DL — SIGNIFICANT CHANGE UP (ref 0.2–1.2)
BILIRUB SERPL-MCNC: 0.5 MG/DL — SIGNIFICANT CHANGE UP (ref 0.2–1.2)
BILIRUB UR-MCNC: NEGATIVE — SIGNIFICANT CHANGE UP
BLD GP AB SCN SERPL QL: SIGNIFICANT CHANGE UP
BUN SERPL-MCNC: 7 MG/DL — LOW (ref 10–20)
CALCIUM SERPL-MCNC: 9.4 MG/DL — SIGNIFICANT CHANGE UP (ref 8.4–10.5)
CHLORIDE SERPL-SCNC: 102 MMOL/L — SIGNIFICANT CHANGE UP (ref 98–110)
CO2 SERPL-SCNC: 26 MMOL/L — SIGNIFICANT CHANGE UP (ref 17–32)
COLOR SPEC: YELLOW — SIGNIFICANT CHANGE UP
CREAT SERPL-MCNC: 0.6 MG/DL — LOW (ref 0.7–1.5)
DIFF PNL FLD: ABNORMAL
EGFR: 124 ML/MIN/1.73M2 — SIGNIFICANT CHANGE UP
EOSINOPHIL # BLD AUTO: 0.02 K/UL — SIGNIFICANT CHANGE UP (ref 0–0.7)
EOSINOPHIL NFR BLD AUTO: 0.5 % — SIGNIFICANT CHANGE UP (ref 0–8)
EPI CELLS # UR: ABNORMAL /HPF
GLUCOSE SERPL-MCNC: 87 MG/DL — SIGNIFICANT CHANGE UP (ref 70–99)
GLUCOSE UR QL: NEGATIVE MG/DL — SIGNIFICANT CHANGE UP
HCG SERPL-ACNC: HIGH MIU/ML
HCT VFR BLD CALC: 32.2 % — LOW (ref 37–47)
HGB BLD-MCNC: 10.8 G/DL — LOW (ref 12–16)
IMM GRANULOCYTES NFR BLD AUTO: 0.3 % — SIGNIFICANT CHANGE UP (ref 0.1–0.3)
KETONES UR-MCNC: NEGATIVE — SIGNIFICANT CHANGE UP
LEUKOCYTE ESTERASE UR-ACNC: ABNORMAL
LIDOCAIN IGE QN: 25 U/L — SIGNIFICANT CHANGE UP (ref 7–60)
LYMPHOCYTES # BLD AUTO: 0.94 K/UL — LOW (ref 1.2–3.4)
LYMPHOCYTES # BLD AUTO: 25.4 % — SIGNIFICANT CHANGE UP (ref 20.5–51.1)
MCHC RBC-ENTMCNC: 27.8 PG — SIGNIFICANT CHANGE UP (ref 27–31)
MCHC RBC-ENTMCNC: 33.5 G/DL — SIGNIFICANT CHANGE UP (ref 32–37)
MCV RBC AUTO: 82.8 FL — SIGNIFICANT CHANGE UP (ref 81–99)
MONOCYTES # BLD AUTO: 0.24 K/UL — SIGNIFICANT CHANGE UP (ref 0.1–0.6)
MONOCYTES NFR BLD AUTO: 6.5 % — SIGNIFICANT CHANGE UP (ref 1.7–9.3)
NEUTROPHILS # BLD AUTO: 2.48 K/UL — SIGNIFICANT CHANGE UP (ref 1.4–6.5)
NEUTROPHILS NFR BLD AUTO: 67 % — SIGNIFICANT CHANGE UP (ref 42.2–75.2)
NITRITE UR-MCNC: NEGATIVE — SIGNIFICANT CHANGE UP
NRBC # BLD: 0 /100 WBCS — SIGNIFICANT CHANGE UP (ref 0–0)
PH UR: 7 — SIGNIFICANT CHANGE UP (ref 5–8)
PLATELET # BLD AUTO: 205 K/UL — SIGNIFICANT CHANGE UP (ref 130–400)
POTASSIUM SERPL-MCNC: 3.9 MMOL/L — SIGNIFICANT CHANGE UP (ref 3.5–5)
POTASSIUM SERPL-SCNC: 3.9 MMOL/L — SIGNIFICANT CHANGE UP (ref 3.5–5)
PROT SERPL-MCNC: 7.9 G/DL — SIGNIFICANT CHANGE UP (ref 6–8)
PROT UR-MCNC: NEGATIVE MG/DL — SIGNIFICANT CHANGE UP
RBC # BLD: 3.89 M/UL — LOW (ref 4.2–5.4)
RBC # FLD: 14 % — SIGNIFICANT CHANGE UP (ref 11.5–14.5)
RBC CASTS # UR COMP ASSIST: ABNORMAL /HPF
SODIUM SERPL-SCNC: 138 MMOL/L — SIGNIFICANT CHANGE UP (ref 135–146)
SP GR SPEC: 1.02 — SIGNIFICANT CHANGE UP (ref 1.01–1.03)
UROBILINOGEN FLD QL: 0.2 MG/DL — SIGNIFICANT CHANGE UP
WBC # BLD: 3.7 K/UL — LOW (ref 4.8–10.8)
WBC # FLD AUTO: 3.7 K/UL — LOW (ref 4.8–10.8)
WBC UR QL: SIGNIFICANT CHANGE UP /HPF

## 2023-01-17 PROCEDURE — 99284 EMERGENCY DEPT VISIT MOD MDM: CPT | Mod: 25

## 2023-01-17 PROCEDURE — 76817 TRANSVAGINAL US OBSTETRIC: CPT | Mod: 26

## 2023-01-17 PROCEDURE — 76830 TRANSVAGINAL US NON-OB: CPT | Mod: 26

## 2023-01-17 RX ORDER — SODIUM CHLORIDE 9 MG/ML
1000 INJECTION, SOLUTION INTRAVENOUS ONCE
Refills: 0 | Status: COMPLETED | OUTPATIENT
Start: 2023-01-17 | End: 2023-01-17

## 2023-01-17 RX ADMIN — SODIUM CHLORIDE 1000 MILLILITER(S): 9 INJECTION, SOLUTION INTRAVENOUS at 12:02

## 2023-01-17 NOTE — ED PROVIDER NOTE - PHYSICAL EXAMINATION
_  Vital signs reviewed; ABCs intact  GENERAL: Well nourished, comfortable  SKIN: Warm, dry  HEAD & NECK: NCAT, supple neck  EYES: EOMI, PER B/L, no conjunctival pallor  ENT: MMM  CARD: RRR, S1, S2; no murmurs, no rubs, no gallops  RESP: Normal respiratory effort, CTAB, no rales, no wheezing  ABD: Soft, ND, NT, no rebound, no guarding, +BS; no CVAT  EXT: Pulses palpable distally; no edema; no calf tenderness; symmetrical calf circumferences  NEUROMSK: Grossly intact  PSYCH: AAOx3, cooperative, appropriate _  Vital signs reviewed; ABCs intact  GENERAL: Well nourished, comfortable  SKIN: Warm, dry  HEAD & NECK: NCAT, supple neck  EYES: EOMI, PER B/L, no conjunctival pallor  ENT: MMM  CARD: RRR, S1, S2; no murmurs, no rubs, no gallops  RESP: Normal respiratory effort, CTAB, no rales, no wheezing  ABD: Soft, ND, NT, no rebound, no guarding, +BS; no CVAT  PELVIC: Normal female external genitalia, +scant blood in the vaginal canal; normal vaginal epithelium, closed cervical os without discharge, no CMT, no adnexal tenderness or masses (performed in the presence of JONATHAN Sheets)  EXT: Pulses palpable distally; no edema; no calf tenderness; symmetrical calf circumferences  NEUROMSK: Grossly intact  PSYCH: AAOx3, cooperative, appropriate

## 2023-01-17 NOTE — ED PROVIDER NOTE - NSFOLLOWUPINSTRUCTIONS_ED_ALL_ED_FT
You were evaluated in the Emergency Department today, and your visit did not reveal anything immediately life-threatening.    However, it is important that you follow-up with your OBGYN (Dr. Arrington) as scheduled.     ---------------------------------------------------------------------------------------------------      Vaginal bleeding in early pregnancy  Vaginal bleeding during early pregnancy is sometimes called a "threatened miscarriage." You might also have belly pain or cramping. Most of the time, the bleeding will stop on its own and the pregnancy will continue normally.    Sometimes, bleeding does end in miscarriage, which is also called "pregnancy loss." This is when a pregnancy ends before 20 weeks. (A normal pregnancy lasts about 40 weeks.)    There are other conditions that can cause bleeding from the vagina during the first half of pregnancy. Rarely, vaginal bleeding and belly pain are caused by an ectopic pregnancy. This is when the pregnancy is located in the wrong place in the body, outside the uterus. This is sometimes called a "tubal pregnancy."    If you are pregnant and have bleeding from your vagina or pain in your belly, call your doctor, nurse, or midwife right away. Bleeding during pregnancy can sometimes be a symptom of an emergency condition such as an ectopic pregnancy. You were evaluated in the Emergency Department today, and your visit did not reveal anything immediately life-threatening.    However, it is important that you follow-up with your OBGYN (Dr. Arrington) as scheduled.     ---------------------------------------------------------------------------------------------------    Vaginal bleeding in early pregnancy  Vaginal bleeding during early pregnancy is sometimes called a "threatened miscarriage." You might also have belly pain or cramping. Most of the time, the bleeding will stop on its own and the pregnancy will continue normally.    Sometimes, bleeding does end in miscarriage, which is also called "pregnancy loss." This is when a pregnancy ends before 20 weeks. (A normal pregnancy lasts about 40 weeks.)    There are other conditions that can cause bleeding from the vagina during the first half of pregnancy. Rarely, vaginal bleeding and belly pain are caused by an ectopic pregnancy. This is when the pregnancy is located in the wrong place in the body, outside the uterus. This is sometimes called a "tubal pregnancy."    If you are pregnant and have bleeding from your vagina or pain in your belly, call your doctor, nurse, or midwife right away. Bleeding during pregnancy can sometimes be a symptom of an emergency condition such as an ectopic pregnancy. You were evaluated in the Emergency Department today, and your visit did not reveal anything immediately life-threatening.    However, it is important that you follow-up with your OBGYN (Dr. Arrington) as scheduled.     ---------------------------------------------------------------------------------------------------    Vaginal bleeding in pregnancy  A small amount of bleeding from the vagina is common during early pregnancy. This kind of bleeding is also called spotting. Sometimes the bleeding is normal and does not cause problems. At other times, though, bleeding may be a sign of something serious.    Normal bleeding in pregnancy can happen:  In first trimester:    - When the fertilized egg attaches itself to your womb.  Any time during pregnancy:   - When blood vessels change because of the pregnancy.   - When you have pelvic exams.   - When you have sex.    Abnormal bleeding can happen:   - When you have an infection.   - When you have growths in your womb. The growths are called polyps.   - If you are having a miscarriage or at risk of having one.   - If you have other problems in your pregnancy.   - Problems of the placenta.   - Early labor.    Get help right away if:   - You have very bad cramps in your back or belly (abdomen).   - You pass large clots or a lot of tissue from your vagina.   - You need to change your sanitary pad or tampon more than one time per hour.   - You feel light-headed, weak, or pass out (faint).   - You have chills and/or fever.    - You are leaking fluid from your vagina.

## 2023-01-17 NOTE — ED PROVIDER NOTE - PATIENT PORTAL LINK FT
You can access the FollowMyHealth Patient Portal offered by [facility] by registering at the following website: [email or web address]. By joining [employer]’s FollowMyHealth portal, you will also be able to view your health information using other applications (apps) compatible with our system.

## 2023-01-17 NOTE — ED PROVIDER NOTE - CLINICAL SUMMARY MEDICAL DECISION MAKING FREE TEXT BOX
32-year-old female, G1 comes in P0, LMP 11/27, vaginal spotting which started yesterday.  No abdominal pain.  No fever/chills.  No trauma.  No urinary symptoms.  Only needed 1 pad today.  No chest pain/shortness of breath.  No rash.  On exam, pt in NAD, AAOx3, head NC/AT, CN II-XII intact, lungs CTA B/L, CV S1S2 regular, abdomen soft/NT/ND/(+)BS, ext (-) edema, motor 5/5x4, sensation intact.  Labs, ultrasound, UA done and reviewed.  Ultrasound confirmed IUP with gestational age 7 weeks 3 days, fetal heart rate 145.  Will DC with OB follow-up.  Return precautions given.  Pelvic rest advised.

## 2023-01-17 NOTE — ED PROVIDER NOTE - OBJECTIVE STATEMENT
Patient is a 32-year-old  female without any past medical history, never smoker, presents for vaginal bleeding since yesterday.  Patient is 7 weeks pregnant by LMP (), confirmed by home pregnancy test; pending first OB visit next week.  No clots; 1 pad today.  No abdominal pain or vaginal discharge. No trauma. No urinary symptoms (dysuria, frequency, urgency).     No other complaints - no fever/chills, rhinorrhea, sore throat, CP, palpitations, SOB, cough, NVD, new joint pain, FND, rash, melena, hematochezia.

## 2023-01-18 LAB
CULTURE RESULTS: SIGNIFICANT CHANGE UP
SPECIMEN SOURCE: SIGNIFICANT CHANGE UP

## 2023-01-24 ENCOUNTER — RESULT CHARGE (OUTPATIENT)
Age: 33
End: 2023-01-24

## 2023-01-24 ENCOUNTER — APPOINTMENT (OUTPATIENT)
Dept: OBGYN | Facility: CLINIC | Age: 33
End: 2023-01-24

## 2023-01-24 ENCOUNTER — APPOINTMENT (OUTPATIENT)
Dept: OBGYN | Facility: CLINIC | Age: 33
End: 2023-01-24
Payer: MEDICAID

## 2023-01-24 ENCOUNTER — OUTPATIENT (OUTPATIENT)
Dept: OUTPATIENT SERVICES | Facility: HOSPITAL | Age: 33
LOS: 1 days | Discharge: HOME | End: 2023-01-24
Payer: MEDICAID

## 2023-01-24 VITALS
DIASTOLIC BLOOD PRESSURE: 72 MMHG | SYSTOLIC BLOOD PRESSURE: 106 MMHG | BODY MASS INDEX: 15.78 KG/M2 | WEIGHT: 98.19 LBS | HEIGHT: 66 IN

## 2023-01-24 LAB
BILIRUB UR QL STRIP: NORMAL
CLARITY UR: CLEAR
COLLECTION METHOD: NORMAL
GLUCOSE UR-MCNC: NORMAL
HCG UR QL: 0.2 EU/DL
HGB UR QL STRIP.AUTO: NORMAL
KETONES UR-MCNC: NORMAL
LEUKOCYTE ESTERASE UR QL STRIP: NORMAL
NITRITE UR QL STRIP: NORMAL
PH UR STRIP: 6
PROT UR STRIP-MCNC: NORMAL
SP GR UR STRIP: 1.01

## 2023-01-24 PROCEDURE — 76815 OB US LIMITED FETUS(S): CPT | Mod: 26

## 2023-01-24 PROCEDURE — 83020 HEMOGLOBIN ELECTROPHORESIS: CPT | Mod: 26

## 2023-01-24 PROCEDURE — 99204 OFFICE O/P NEW MOD 45 MIN: CPT | Mod: 25

## 2023-01-26 ENCOUNTER — NON-APPOINTMENT (OUTPATIENT)
Age: 33
End: 2023-01-26

## 2023-01-26 LAB
ABO + RH PNL BLD: NORMAL
BASOPHILS # BLD AUTO: 0.01 K/UL
BASOPHILS NFR BLD AUTO: 0.3 %
BLD GP AB SCN SERPL QL: NORMAL
EOSINOPHIL # BLD AUTO: 0.01 K/UL
EOSINOPHIL NFR BLD AUTO: 0.3 %
ESTIMATED AVERAGE GLUCOSE: 103 MG/DL
HBA1C MFR BLD HPLC: 5.2 %
HBV SURFACE AG SERPL QL IA: NONREACTIVE
HCT VFR BLD CALC: 31.7 %
HCV AB SER QL: NONREACTIVE
HCV S/CO RATIO: 0.1 S/CO
HGB BLD-MCNC: 10.5 G/DL
HIV1+2 AB SPEC QL IA.RAPID: NONREACTIVE
IMM GRANULOCYTES NFR BLD AUTO: 0.3 %
LEAD BLD-MCNC: <1 UG/DL
LYMPHOCYTES # BLD AUTO: 1.13 K/UL
LYMPHOCYTES NFR BLD AUTO: 29 %
MAN DIFF?: NORMAL
MCHC RBC-ENTMCNC: 28.2 PG
MCHC RBC-ENTMCNC: 33.1 G/DL
MCV RBC AUTO: 85 FL
MEV IGG FLD QL IA: <5 AU/ML
MEV IGG+IGM SER-IMP: NEGATIVE
MONOCYTES # BLD AUTO: 0.25 K/UL
MONOCYTES NFR BLD AUTO: 6.4 %
NEUTROPHILS # BLD AUTO: 2.48 K/UL
NEUTROPHILS NFR BLD AUTO: 63.7 %
PLATELET # BLD AUTO: 217 K/UL
RBC # BLD: 3.73 M/UL
RBC # FLD: 14.8 %
RUBV IGG FLD-ACNC: 20.1 INDEX
RUBV IGG SER-IMP: POSITIVE
T PALLIDUM AB SER QL IA: NEGATIVE
VZV AB TITR SER: POSITIVE
VZV IGG SER IF-ACNC: 1415 INDEX
WBC # FLD AUTO: 3.89 K/UL

## 2023-01-26 RX ORDER — CHLORHEXIDINE GLUCONATE 4 %
325 (65 FE) LIQUID (ML) TOPICAL TWICE DAILY
Qty: 60 | Refills: 5 | Status: ACTIVE | COMMUNITY
Start: 2023-01-26 | End: 1900-01-01

## 2023-02-01 LAB
AR GENE MUT ANL BLD/T: NORMAL
C TRACH RRNA SPEC QL NAA+PROBE: NOT DETECTED
CFTR MUT TESTED BLD/T: NEGATIVE
CYTOLOGY CVX/VAG DOC THIN PREP: NORMAL
FMR1 GENE MUT ANL BLD/T: NORMAL
HGB A MFR BLD: 97.4 %
HGB A2 MFR BLD: 2.6 %
HGB FRACT BLD-IMP: NORMAL
HPV HIGH+LOW RISK DNA PNL CVX: NOT DETECTED
N GONORRHOEA RRNA SPEC QL NAA+PROBE: NOT DETECTED
SOURCE AMPLIFICATION: NORMAL

## 2023-02-06 NOTE — ED ADULT TRIAGE NOTE - CCCP TRG CHIEF CMPLNT
Detail Level: Detailed Add 74196 Cpt? (Important Note: In 2017 The Use Of 10375 Is Being Tracked By Cms To Determine Future Global Period Reimbursement For Global Periods): yes Wound Evaluated By (Optional): dang Wound Diameter In Cm(Optional): 0 Wound Crusting?: crusted Sutures?: intact Wound Color?: pink vaginal bleeding

## 2023-02-14 ENCOUNTER — OUTPATIENT (OUTPATIENT)
Dept: OUTPATIENT SERVICES | Facility: HOSPITAL | Age: 33
LOS: 1 days | End: 2023-02-14
Payer: COMMERCIAL

## 2023-02-14 ENCOUNTER — ASOB RESULT (OUTPATIENT)
Age: 33
End: 2023-02-14

## 2023-02-14 ENCOUNTER — APPOINTMENT (OUTPATIENT)
Dept: ANTEPARTUM | Facility: CLINIC | Age: 33
End: 2023-02-14

## 2023-02-14 ENCOUNTER — APPOINTMENT (OUTPATIENT)
Dept: ANTEPARTUM | Facility: CLINIC | Age: 33
End: 2023-02-14
Payer: MEDICAID

## 2023-02-14 ENCOUNTER — APPOINTMENT (OUTPATIENT)
Dept: OBGYN | Facility: CLINIC | Age: 33
End: 2023-02-14
Payer: MEDICAID

## 2023-02-14 ENCOUNTER — APPOINTMENT (OUTPATIENT)
Dept: OBGYN | Facility: CLINIC | Age: 33
End: 2023-02-14

## 2023-02-14 VITALS — HEIGHT: 66 IN | WEIGHT: 99 LBS | BODY MASS INDEX: 15.91 KG/M2

## 2023-02-14 DIAGNOSIS — Z34.90 ENCOUNTER FOR SUPERVISION OF NORMAL PREGNANCY, UNSPECIFIED, UNSPECIFIED TRIMESTER: ICD-10-CM

## 2023-02-14 PROCEDURE — 76801 OB US < 14 WKS SINGLE FETUS: CPT

## 2023-02-14 PROCEDURE — 76801 OB US < 14 WKS SINGLE FETUS: CPT | Mod: 26

## 2023-02-15 ENCOUNTER — NON-APPOINTMENT (OUTPATIENT)
Age: 33
End: 2023-02-15

## 2023-02-17 DIAGNOSIS — Z3A.11 11 WEEKS GESTATION OF PREGNANCY: ICD-10-CM

## 2023-02-17 DIAGNOSIS — O36.80X0 PREGNANCY WITH INCONCLUSIVE FETAL VIABILITY, NOT APPLICABLE OR UNSPECIFIED: ICD-10-CM

## 2023-02-22 ENCOUNTER — NON-APPOINTMENT (OUTPATIENT)
Age: 33
End: 2023-02-22

## 2023-02-24 ENCOUNTER — APPOINTMENT (OUTPATIENT)
Dept: OBGYN | Facility: CLINIC | Age: 33
End: 2023-02-24

## 2023-02-24 ENCOUNTER — APPOINTMENT (OUTPATIENT)
Dept: ANTEPARTUM | Facility: CLINIC | Age: 33
End: 2023-02-24
Payer: MEDICAID

## 2023-02-24 ENCOUNTER — ASOB RESULT (OUTPATIENT)
Age: 33
End: 2023-02-24

## 2023-02-24 ENCOUNTER — OUTPATIENT (OUTPATIENT)
Dept: OUTPATIENT SERVICES | Facility: HOSPITAL | Age: 33
LOS: 1 days | End: 2023-02-24
Payer: MEDICAID

## 2023-02-24 ENCOUNTER — APPOINTMENT (OUTPATIENT)
Dept: OBGYN | Facility: CLINIC | Age: 33
End: 2023-02-24
Payer: MEDICAID

## 2023-02-24 ENCOUNTER — RESULT CHARGE (OUTPATIENT)
Age: 33
End: 2023-02-24

## 2023-02-24 VITALS
SYSTOLIC BLOOD PRESSURE: 117 MMHG | WEIGHT: 100 LBS | HEIGHT: 66 IN | DIASTOLIC BLOOD PRESSURE: 65 MMHG | BODY MASS INDEX: 16.07 KG/M2

## 2023-02-24 DIAGNOSIS — Z34.90 ENCOUNTER FOR SUPERVISION OF NORMAL PREGNANCY, UNSPECIFIED, UNSPECIFIED TRIMESTER: ICD-10-CM

## 2023-02-24 PROCEDURE — 99213 OFFICE O/P EST LOW 20 MIN: CPT

## 2023-02-24 PROCEDURE — 76813 OB US NUCHAL MEAS 1 GEST: CPT | Mod: 26

## 2023-02-24 PROCEDURE — 81002 URINALYSIS NONAUTO W/O SCOPE: CPT

## 2023-02-24 PROCEDURE — 76813 OB US NUCHAL MEAS 1 GEST: CPT

## 2023-02-28 DIAGNOSIS — Z3A.12 12 WEEKS GESTATION OF PREGNANCY: ICD-10-CM

## 2023-02-28 DIAGNOSIS — Z36.82 ENCOUNTER FOR ANTENATAL SCREENING FOR NUCHAL TRANSLUCENCY: ICD-10-CM

## 2023-02-28 DIAGNOSIS — Z34.91 ENCOUNTER FOR SUPERVISION OF NORMAL PREGNANCY, UNSPECIFIED, FIRST TRIMESTER: ICD-10-CM

## 2023-03-01 LAB
BILIRUB UR QL STRIP: NORMAL
CHROMOSOME13 INTERPRETATION: NORMAL
CHROMOSOME13 TEST RESULT: NORMAL
CHROMOSOME18 INTERPRETATION: NORMAL
CHROMOSOME18 TEST RESULT: NORMAL
CHROMOSOME21 INTERPRETATION: NORMAL
CHROMOSOME21 TEST RESULT: NORMAL
CLARITY UR: CLEAR
COLLECTION METHOD: NORMAL
FETAL FRACTION: NORMAL
GLUCOSE UR-MCNC: NORMAL
HCG UR QL: 0.2 EU/DL
HGB UR QL STRIP.AUTO: NORMAL
KETONES UR-MCNC: NORMAL
LEUKOCYTE ESTERASE UR QL STRIP: NORMAL
NITRITE UR QL STRIP: NORMAL
PERFORMANCE AND LIMITATIONS: NORMAL
PH UR STRIP: 7
PROT UR STRIP-MCNC: NORMAL
SEX CHROMOSOME INTERPRETATION: NORMAL
SEX CHROMOSOME TEST RESULT: NORMAL
SP GR UR STRIP: 1.02
VERIFI PRENATAL TEST: NOT DETECTED

## 2023-03-15 ENCOUNTER — NON-APPOINTMENT (OUTPATIENT)
Age: 33
End: 2023-03-15

## 2023-03-17 ENCOUNTER — RESULT CHARGE (OUTPATIENT)
Age: 33
End: 2023-03-17

## 2023-03-17 ENCOUNTER — APPOINTMENT (OUTPATIENT)
Dept: OBGYN | Facility: CLINIC | Age: 33
End: 2023-03-17
Payer: MEDICAID

## 2023-03-17 ENCOUNTER — OUTPATIENT (OUTPATIENT)
Dept: OUTPATIENT SERVICES | Facility: HOSPITAL | Age: 33
LOS: 1 days | End: 2023-03-17
Payer: MEDICAID

## 2023-03-17 VITALS — BODY MASS INDEX: 16.38 KG/M2 | SYSTOLIC BLOOD PRESSURE: 99 MMHG | DIASTOLIC BLOOD PRESSURE: 55 MMHG | WEIGHT: 101.5 LBS

## 2023-03-17 DIAGNOSIS — Z34.90 ENCOUNTER FOR SUPERVISION OF NORMAL PREGNANCY, UNSPECIFIED, UNSPECIFIED TRIMESTER: ICD-10-CM

## 2023-03-17 PROCEDURE — 99213 OFFICE O/P EST LOW 20 MIN: CPT

## 2023-03-17 PROCEDURE — 81002 URINALYSIS NONAUTO W/O SCOPE: CPT

## 2023-03-22 ENCOUNTER — OUTPATIENT (OUTPATIENT)
Dept: OUTPATIENT SERVICES | Facility: HOSPITAL | Age: 33
LOS: 1 days | End: 2023-03-22
Payer: MEDICAID

## 2023-03-22 ENCOUNTER — APPOINTMENT (OUTPATIENT)
Dept: OBGYN | Facility: CLINIC | Age: 33
End: 2023-03-22
Payer: MEDICAID

## 2023-03-22 ENCOUNTER — ASOB RESULT (OUTPATIENT)
Age: 33
End: 2023-03-22

## 2023-03-22 ENCOUNTER — APPOINTMENT (OUTPATIENT)
Dept: ANTEPARTUM | Facility: CLINIC | Age: 33
End: 2023-03-22
Payer: MEDICAID

## 2023-03-22 VITALS — BODY MASS INDEX: 16.48 KG/M2 | WEIGHT: 102.1 LBS | DIASTOLIC BLOOD PRESSURE: 60 MMHG | SYSTOLIC BLOOD PRESSURE: 99 MMHG

## 2023-03-22 DIAGNOSIS — Z34.90 ENCOUNTER FOR SUPERVISION OF NORMAL PREGNANCY, UNSPECIFIED, UNSPECIFIED TRIMESTER: ICD-10-CM

## 2023-03-22 DIAGNOSIS — Z34.92 ENCOUNTER FOR SUPERVISION OF NORMAL PREGNANCY, UNSPECIFIED, SECOND TRIMESTER: ICD-10-CM

## 2023-03-22 PROCEDURE — 81002 URINALYSIS NONAUTO W/O SCOPE: CPT

## 2023-03-22 PROCEDURE — 99213 OFFICE O/P EST LOW 20 MIN: CPT

## 2023-03-22 PROCEDURE — 76805 OB US >/= 14 WKS SNGL FETUS: CPT | Mod: 26

## 2023-03-22 PROCEDURE — 76805 OB US >/= 14 WKS SNGL FETUS: CPT

## 2023-03-22 RX ORDER — ONDANSETRON 4 MG/1
4 TABLET ORAL EVERY 8 HOURS
Qty: 30 | Refills: 10 | Status: ACTIVE | COMMUNITY
Start: 2023-03-22 | End: 1900-01-01

## 2023-03-23 DIAGNOSIS — Z34.92 ENCOUNTER FOR SUPERVISION OF NORMAL PREGNANCY, UNSPECIFIED, SECOND TRIMESTER: ICD-10-CM

## 2023-03-24 DIAGNOSIS — Z3A.16 16 WEEKS GESTATION OF PREGNANCY: ICD-10-CM

## 2023-04-12 ENCOUNTER — NON-APPOINTMENT (OUTPATIENT)
Age: 33
End: 2023-04-12

## 2023-04-12 LAB
AFP MOM: 1.38
AFP VALUE: 84.6 NG/ML
ALPHA FETOPROTEIN SERUM COMMENT: NORMAL
ALPHA FETOPROTEIN SERUM INTERPRETATION: NORMAL
ALPHA FETOPROTEIN SERUM RESULTS: NORMAL
ALPHA FETOPROTEIN SERUM TEST RESULTS: NORMAL
GESTATIONAL AGE BASED ON: NORMAL
GESTATIONAL AGE ON COLLECTION DATE: 18.3 WEEKS
INSULIN DEP DIABETES: NO
MATERNAL AGE AT EDD AFP: 30.6 YR
MULTIPLE GESTATION: NO
OSBR RISK 1 IN: 3810
RACE: NORMAL
WEIGHT AFP: 101 LBS

## 2023-04-14 ENCOUNTER — NON-APPOINTMENT (OUTPATIENT)
Age: 33
End: 2023-04-14

## 2023-04-14 ENCOUNTER — OUTPATIENT (OUTPATIENT)
Dept: OUTPATIENT SERVICES | Facility: HOSPITAL | Age: 33
LOS: 1 days | End: 2023-04-14
Payer: MEDICAID

## 2023-04-14 ENCOUNTER — APPOINTMENT (OUTPATIENT)
Dept: OBGYN | Facility: CLINIC | Age: 33
End: 2023-04-14
Payer: MEDICAID

## 2023-04-14 ENCOUNTER — RESULT CHARGE (OUTPATIENT)
Age: 33
End: 2023-04-14

## 2023-04-14 VITALS — DIASTOLIC BLOOD PRESSURE: 60 MMHG | SYSTOLIC BLOOD PRESSURE: 94 MMHG | BODY MASS INDEX: 16.95 KG/M2 | WEIGHT: 105 LBS

## 2023-04-14 DIAGNOSIS — Z34.90 ENCOUNTER FOR SUPERVISION OF NORMAL PREGNANCY, UNSPECIFIED, UNSPECIFIED TRIMESTER: ICD-10-CM

## 2023-04-14 PROCEDURE — 99213 OFFICE O/P EST LOW 20 MIN: CPT

## 2023-04-16 LAB
BILIRUB UR QL STRIP: NORMAL
CLARITY UR: CLEAR
COLLECTION METHOD: NORMAL
GLUCOSE UR-MCNC: NORMAL
HCG UR QL: 0.2 EU/DL
HGB UR QL STRIP.AUTO: NORMAL
KETONES UR-MCNC: NORMAL
LEUKOCYTE ESTERASE UR QL STRIP: NORMAL
NITRITE UR QL STRIP: NORMAL
PH UR STRIP: 6
PROT UR STRIP-MCNC: NORMAL
SP GR UR STRIP: 1.02

## 2023-04-17 DIAGNOSIS — Z34.92 ENCOUNTER FOR SUPERVISION OF NORMAL PREGNANCY, UNSPECIFIED, SECOND TRIMESTER: ICD-10-CM

## 2023-04-24 ENCOUNTER — OUTPATIENT (OUTPATIENT)
Dept: OUTPATIENT SERVICES | Facility: HOSPITAL | Age: 33
LOS: 1 days | End: 2023-04-24
Payer: MEDICAID

## 2023-04-24 DIAGNOSIS — Z00.8 ENCOUNTER FOR OTHER GENERAL EXAMINATION: ICD-10-CM

## 2023-04-24 DIAGNOSIS — R22.9 LOCALIZED SWELLING, MASS AND LUMP, UNSPECIFIED: ICD-10-CM

## 2023-04-24 PROCEDURE — 93971 EXTREMITY STUDY: CPT | Mod: 26,RT

## 2023-04-24 PROCEDURE — 93971 EXTREMITY STUDY: CPT | Mod: RT

## 2023-04-25 DIAGNOSIS — R22.9 LOCALIZED SWELLING, MASS AND LUMP, UNSPECIFIED: ICD-10-CM

## 2023-05-03 ENCOUNTER — ASOB RESULT (OUTPATIENT)
Age: 33
End: 2023-05-03

## 2023-05-03 ENCOUNTER — OUTPATIENT (OUTPATIENT)
Dept: OUTPATIENT SERVICES | Facility: HOSPITAL | Age: 33
LOS: 1 days | End: 2023-05-03
Payer: MEDICAID

## 2023-05-03 ENCOUNTER — APPOINTMENT (OUTPATIENT)
Dept: ANTEPARTUM | Facility: CLINIC | Age: 33
End: 2023-05-03
Payer: MEDICAID

## 2023-05-03 DIAGNOSIS — Z34.90 ENCOUNTER FOR SUPERVISION OF NORMAL PREGNANCY, UNSPECIFIED, UNSPECIFIED TRIMESTER: ICD-10-CM

## 2023-05-03 PROCEDURE — 76805 OB US >/= 14 WKS SNGL FETUS: CPT

## 2023-05-03 PROCEDURE — 76805 OB US >/= 14 WKS SNGL FETUS: CPT | Mod: 26

## 2023-05-03 PROCEDURE — 76817 TRANSVAGINAL US OBSTETRIC: CPT | Mod: 26

## 2023-05-03 PROCEDURE — 76817 TRANSVAGINAL US OBSTETRIC: CPT

## 2023-05-03 RX ORDER — ERGOCALCIFEROL (VITAMIN D2) 10 MCG
27-0.8 TABLET ORAL DAILY
Qty: 1 | Refills: 3 | Status: ACTIVE | COMMUNITY
Start: 2023-05-03 | End: 1900-01-01

## 2023-05-04 ENCOUNTER — NON-APPOINTMENT (OUTPATIENT)
Age: 33
End: 2023-05-04

## 2023-05-04 DIAGNOSIS — Z36.3 ENCOUNTER FOR ANTENATAL SCREENING FOR MALFORMATIONS: ICD-10-CM

## 2023-05-04 DIAGNOSIS — Z3A.22 22 WEEKS GESTATION OF PREGNANCY: ICD-10-CM

## 2023-05-10 ENCOUNTER — NON-APPOINTMENT (OUTPATIENT)
Age: 33
End: 2023-05-10

## 2023-05-12 ENCOUNTER — OUTPATIENT (OUTPATIENT)
Dept: OUTPATIENT SERVICES | Facility: HOSPITAL | Age: 33
LOS: 1 days | End: 2023-05-12
Payer: MEDICAID

## 2023-05-12 ENCOUNTER — RESULT CHARGE (OUTPATIENT)
Age: 33
End: 2023-05-12

## 2023-05-12 ENCOUNTER — APPOINTMENT (OUTPATIENT)
Dept: OBGYN | Facility: CLINIC | Age: 33
End: 2023-05-12
Payer: MEDICAID

## 2023-05-12 VITALS
HEIGHT: 66 IN | SYSTOLIC BLOOD PRESSURE: 98 MMHG | WEIGHT: 111 LBS | DIASTOLIC BLOOD PRESSURE: 56 MMHG | BODY MASS INDEX: 17.84 KG/M2

## 2023-05-12 DIAGNOSIS — Z34.92 ENCOUNTER FOR SUPERVISION OF NORMAL PREGNANCY, UNSPECIFIED, SECOND TRIMESTER: ICD-10-CM

## 2023-05-12 DIAGNOSIS — Z34.90 ENCOUNTER FOR SUPERVISION OF NORMAL PREGNANCY, UNSPECIFIED, UNSPECIFIED TRIMESTER: ICD-10-CM

## 2023-05-12 PROCEDURE — 99213 OFFICE O/P EST LOW 20 MIN: CPT

## 2023-05-12 PROCEDURE — 81002 URINALYSIS NONAUTO W/O SCOPE: CPT

## 2023-05-16 DIAGNOSIS — Z34.92 ENCOUNTER FOR SUPERVISION OF NORMAL PREGNANCY, UNSPECIFIED, SECOND TRIMESTER: ICD-10-CM

## 2023-05-25 ENCOUNTER — NON-APPOINTMENT (OUTPATIENT)
Age: 33
End: 2023-05-25

## 2023-05-30 ENCOUNTER — NON-APPOINTMENT (OUTPATIENT)
Age: 33
End: 2023-05-30

## 2023-05-31 ENCOUNTER — OUTPATIENT (OUTPATIENT)
Dept: OUTPATIENT SERVICES | Facility: HOSPITAL | Age: 33
LOS: 1 days | End: 2023-05-31
Payer: MEDICAID

## 2023-05-31 ENCOUNTER — NON-APPOINTMENT (OUTPATIENT)
Age: 33
End: 2023-05-31

## 2023-05-31 DIAGNOSIS — Z34.92 ENCOUNTER FOR SUPERVISION OF NORMAL PREGNANCY, UNSPECIFIED, SECOND TRIMESTER: ICD-10-CM

## 2023-05-31 PROCEDURE — 85027 COMPLETE CBC AUTOMATED: CPT

## 2023-05-31 PROCEDURE — 82950 GLUCOSE TEST: CPT

## 2023-06-01 DIAGNOSIS — Z34.92 ENCOUNTER FOR SUPERVISION OF NORMAL PREGNANCY, UNSPECIFIED, SECOND TRIMESTER: ICD-10-CM

## 2023-06-06 LAB — GLUCOSE 1H P 50 G GLC PO SERPL-MCNC: 105 MG/DL

## 2023-06-07 ENCOUNTER — NON-APPOINTMENT (OUTPATIENT)
Age: 33
End: 2023-06-07

## 2023-06-14 ENCOUNTER — NON-APPOINTMENT (OUTPATIENT)
Age: 33
End: 2023-06-14

## 2023-06-16 ENCOUNTER — APPOINTMENT (OUTPATIENT)
Dept: OBGYN | Facility: CLINIC | Age: 33
End: 2023-06-16
Payer: MEDICAID

## 2023-06-16 ENCOUNTER — RESULT CHARGE (OUTPATIENT)
Age: 33
End: 2023-06-16

## 2023-06-16 ENCOUNTER — OUTPATIENT (OUTPATIENT)
Dept: OUTPATIENT SERVICES | Facility: HOSPITAL | Age: 33
LOS: 1 days | End: 2023-06-16
Payer: MEDICAID

## 2023-06-16 VITALS — DIASTOLIC BLOOD PRESSURE: 54 MMHG | SYSTOLIC BLOOD PRESSURE: 98 MMHG | BODY MASS INDEX: 18.72 KG/M2 | WEIGHT: 116 LBS

## 2023-06-16 DIAGNOSIS — Z34.90 ENCOUNTER FOR SUPERVISION OF NORMAL PREGNANCY, UNSPECIFIED, UNSPECIFIED TRIMESTER: ICD-10-CM

## 2023-06-16 PROCEDURE — 81002 URINALYSIS NONAUTO W/O SCOPE: CPT

## 2023-06-16 PROCEDURE — 99213 OFFICE O/P EST LOW 20 MIN: CPT

## 2023-06-21 DIAGNOSIS — Z34.83 ENCOUNTER FOR SUPERVISION OF OTHER NORMAL PREGNANCY, THIRD TRIMESTER: ICD-10-CM

## 2023-06-21 LAB
BILIRUB UR QL STRIP: NORMAL
CLARITY UR: CLEAR
COLLECTION METHOD: NORMAL
GLUCOSE UR-MCNC: NORMAL
HCG UR QL: 0.2 EU/DL
HGB UR QL STRIP.AUTO: NORMAL
KETONES UR-MCNC: NORMAL
LEUKOCYTE ESTERASE UR QL STRIP: NORMAL
NITRITE UR QL STRIP: NORMAL
PH UR STRIP: 7
PROT UR STRIP-MCNC: NORMAL
SP GR UR STRIP: 1.01

## 2023-06-28 ENCOUNTER — NON-APPOINTMENT (OUTPATIENT)
Age: 33
End: 2023-06-28

## 2023-06-30 ENCOUNTER — OUTPATIENT (OUTPATIENT)
Dept: OUTPATIENT SERVICES | Facility: HOSPITAL | Age: 33
LOS: 1 days | End: 2023-06-30
Payer: MEDICAID

## 2023-06-30 ENCOUNTER — RESULT CHARGE (OUTPATIENT)
Age: 33
End: 2023-06-30

## 2023-06-30 ENCOUNTER — APPOINTMENT (OUTPATIENT)
Dept: OBGYN | Facility: CLINIC | Age: 33
End: 2023-06-30
Payer: MEDICAID

## 2023-06-30 VITALS
SYSTOLIC BLOOD PRESSURE: 103 MMHG | DIASTOLIC BLOOD PRESSURE: 64 MMHG | HEIGHT: 66 IN | BODY MASS INDEX: 18.8 KG/M2 | HEART RATE: 89 BPM | WEIGHT: 117 LBS

## 2023-06-30 DIAGNOSIS — Z34.90 ENCOUNTER FOR SUPERVISION OF NORMAL PREGNANCY, UNSPECIFIED, UNSPECIFIED TRIMESTER: ICD-10-CM

## 2023-06-30 DIAGNOSIS — Z34.83 ENCOUNTER FOR SUPERVISION OF OTHER NORMAL PREGNANCY, THIRD TRIMESTER: ICD-10-CM

## 2023-06-30 PROCEDURE — 99213 OFFICE O/P EST LOW 20 MIN: CPT

## 2023-07-01 LAB
BILIRUB UR QL STRIP: NEGATIVE
CLARITY UR: CLEAR
COLLECTION METHOD: NORMAL
GLUCOSE UR-MCNC: NEGATIVE
HCG UR QL: 0.2 EU/DL
HGB UR QL STRIP.AUTO: NEGATIVE
KETONES UR-MCNC: NEGATIVE
LEUKOCYTE ESTERASE UR QL STRIP: NEGATIVE
NITRITE UR QL STRIP: NEGATIVE
PH UR STRIP: 7
PROT UR STRIP-MCNC: NEGATIVE
SP GR UR STRIP: 1.02

## 2023-07-06 ENCOUNTER — OUTPATIENT (OUTPATIENT)
Dept: INPATIENT UNIT | Facility: HOSPITAL | Age: 33
LOS: 1 days | Discharge: HOME | End: 2023-07-06
Payer: MEDICAID

## 2023-07-06 VITALS
SYSTOLIC BLOOD PRESSURE: 99 MMHG | DIASTOLIC BLOOD PRESSURE: 57 MMHG | HEART RATE: 88 BPM | RESPIRATION RATE: 18 BRPM | TEMPERATURE: 98 F

## 2023-07-06 VITALS — DIASTOLIC BLOOD PRESSURE: 62 MMHG | HEART RATE: 93 BPM | SYSTOLIC BLOOD PRESSURE: 98 MMHG

## 2023-07-06 DIAGNOSIS — O26.899 OTHER SPECIFIED PREGNANCY RELATED CONDITIONS, UNSPECIFIED TRIMESTER: ICD-10-CM

## 2023-07-06 DIAGNOSIS — Z3A.00 WEEKS OF GESTATION OF PREGNANCY NOT SPECIFIED: ICD-10-CM

## 2023-07-06 LAB
APPEARANCE UR: CLEAR — SIGNIFICANT CHANGE UP
BACTERIA # UR AUTO: ABNORMAL
BILIRUB UR-MCNC: NEGATIVE — SIGNIFICANT CHANGE UP
COLOR SPEC: SIGNIFICANT CHANGE UP
DIFF PNL FLD: NEGATIVE — SIGNIFICANT CHANGE UP
EPI CELLS # UR: 1 /HPF — SIGNIFICANT CHANGE UP (ref 0–5)
GLUCOSE UR QL: NEGATIVE — SIGNIFICANT CHANGE UP
HYALINE CASTS # UR AUTO: 0 /LPF — SIGNIFICANT CHANGE UP (ref 0–7)
KETONES UR-MCNC: NEGATIVE — SIGNIFICANT CHANGE UP
LEUKOCYTE ESTERASE UR-ACNC: ABNORMAL
NITRITE UR-MCNC: NEGATIVE — SIGNIFICANT CHANGE UP
PH UR: 7.5 — SIGNIFICANT CHANGE UP (ref 5–8)
PROT UR-MCNC: NEGATIVE — SIGNIFICANT CHANGE UP
RBC CASTS # UR COMP ASSIST: 3 /HPF — SIGNIFICANT CHANGE UP (ref 0–4)
SP GR SPEC: 1.01 — LOW (ref 1.01–1.03)
UROBILINOGEN FLD QL: SIGNIFICANT CHANGE UP
WBC UR QL: 23 /HPF — HIGH (ref 0–5)

## 2023-07-06 PROCEDURE — 87086 URINE CULTURE/COLONY COUNT: CPT

## 2023-07-06 PROCEDURE — 99214 OFFICE O/P EST MOD 30 MIN: CPT

## 2023-07-06 PROCEDURE — 59025 FETAL NON-STRESS TEST: CPT | Mod: 26

## 2023-07-06 PROCEDURE — 99215 OFFICE O/P EST HI 40 MIN: CPT | Mod: 25

## 2023-07-06 PROCEDURE — 81001 URINALYSIS AUTO W/SCOPE: CPT

## 2023-07-06 PROCEDURE — 76815 OB US LIMITED FETUS(S): CPT | Mod: 26

## 2023-07-06 NOTE — OB PROVIDER TRIAGE NOTE - NSHPPHYSICALEXAM_GEN_ALL_CORE
T(C): 36.9 (07-06-23 @ 07:17), Max: 36.9 (07-06-23 @ 07:15)  HR: 93 (07-06-23 @ 08:44) (88 - 93)  BP: 98/62 (07-06-23 @ 08:44) (98/62 - 99/57)  RR: 18 (07-06-23 @ 07:17) (18 - 18)    EFM: 140 bpm, moderate variability, +accels  TOCO: none    Abd: soft, gravid, nontender, no incisional tenderness

## 2023-07-06 NOTE — OB PROVIDER TRIAGE NOTE - ADDITIONAL INSTRUCTIONS
- discharge home  - PO hydration recommended  -  labor precautions given  - kick count instructions discussed  - follow up with PMD as scheduled  - will follow up on Urine culture

## 2023-07-06 NOTE — OB PROVIDER TRIAGE NOTE - NSOBPROVIDERNOTE_OBGYN_ALL_OB_FT
32y  @ 31.4 weeks, GBS unknown, not ruptured or in  labor.    -Will follow up on UA and Ucx  -Discharged Home  -Labor precautions reviewed  -PO Hydration encouraged  -Advised to count fetal kick counts  -Follow up with your scheduled OB visit    Dr. Rizo aware

## 2023-07-06 NOTE — OB RN TRIAGE NOTE - NS_TRIAGEADDITIONAL COMMENTS_OBGYN_ALL_OB_FT
Pt in no acute distress, denies bleeding. Endorses frequent urination. BRANNON Boykin aware. UA/Ucx collected & sent to lab.

## 2023-07-06 NOTE — OB PROVIDER TRIAGE NOTE - HISTORY OF PRESENT ILLNESS
32y  @ weeks by _, CANDIS _, presents for _.  Denies HA, fever, blurry vision, chest pain, SOB, abdominal pain, constipation, diarrhea, nausea, vomiting, dysuria, frequency, hematuria, leg pain, and abnormal swelling of the hands and feet. Denies VB, LOF, and ctx. +FM. Last seen in the office on _. No complications during this pregnancy.    Last sexual intercourse was _.  Last bowel movement was .   Last meal was .  32y  @ 31.4 weeks by LMP, CANDIS 9/3/2023, presents for possible rupture of membranes. Patient states she felt a trickle last night at midnight and continues to feel a trickle, clear. Also endorses low back pain and increased frequency. Denies a gush a fluid. Denies HA, fever, blurry vision, chest pain, SOB, abdominal pain, constipation, diarrhea, nausea, vomiting, dysuria, hematuria, leg pain, and abnormal swelling of the hands and feet. Denies VB and ctx. +FM. No complications during this pregnancy.    Last sexual intercourse was last night.  Last bowel movement was yesterday afternoon.   Last meal was yesterday.

## 2023-07-06 NOTE — OB PROVIDER TRIAGE NOTE - NS ATTEND AMEND GEN_ALL_CORE FT
I was physically present for the key portions of the evaluation and management (e/m) service provided. I agree with the above history,physical and plan which I have reviewed and edited where appropriate    Patient seen face to face and case reviewed, precautions given to patient    Patient presented to triage and I started my evaluation at 07:30 The fetal heart rate monitor ended 08:24. I spent 54 minutes caring for the patient. It included obtaining a history, performing an examination, continuously monitoring the fetus and interpretation of the fetal heart rate strip, ordering and reviewing labs, documentingin the medical record and a discussion with the patient.    nst reactive    sonogram mvp>2cm

## 2023-07-06 NOTE — OB PROVIDER TRIAGE NOTE - ABORTIONS, OB PROFILE
Zac Payne,    This patient was on Prolia in the past but gotten away from this.  She does need to restart.  I did Re sign her therapy plan. I have an up-to-date bone density test on her.  Blood calcium is listed as low but normal when correcting for albumin, corrected calcium is 8.92            
0

## 2023-07-06 NOTE — OB PROVIDER TRIAGE NOTE - NS_OBGYNHISTORY_OBGYN_ALL_OB_FT
OB Hx:  x 1. Largest 6                G1 2017 FT  F 6-0 JFK No complications No Epi    Gyn Hx:  Denies cysts, fibroids, abnormal paps, and STIs.

## 2023-07-06 NOTE — OB PROVIDER TRIAGE NOTE - NSHPLABSRESULTS_GEN_ALL_CORE
Date:  Blood type: AB+  HBsAg: NR  HIV: Neg  Rubella: Immune  Measles: Non Immune  RPR: Negative  Gonorrhea: Neg  Chlamydia: Neg  Varicella: Immune  CF: Neg  SMA: Neg  Pap smear: NILM    GCT: 105    ANEUPLOIDY SCREENING: Negative    SONOGRAMS:   5/4/2023: 22.3 weeks:  gms, breech, ant placenta, MAURICE 6.11, CL 3.46 cm, no major fetal malformations noted  3/22/2023: 16.3 weeks: , ant placenta, MAURICE 4.21 cm  2/24/2023: 12.5 weeks: breech, ant placenta, NT 2.72 mm (94%)    4/24/2023: Duplex: Negative

## 2023-07-07 LAB
CULTURE RESULTS: SIGNIFICANT CHANGE UP
SPECIMEN SOURCE: SIGNIFICANT CHANGE UP

## 2023-07-17 ENCOUNTER — NON-APPOINTMENT (OUTPATIENT)
Age: 33
End: 2023-07-17

## 2023-07-25 ENCOUNTER — APPOINTMENT (OUTPATIENT)
Dept: OBGYN | Facility: CLINIC | Age: 33
End: 2023-07-25
Payer: MEDICAID

## 2023-07-25 ENCOUNTER — RESULT CHARGE (OUTPATIENT)
Age: 33
End: 2023-07-25

## 2023-07-25 ENCOUNTER — OUTPATIENT (OUTPATIENT)
Dept: OUTPATIENT SERVICES | Facility: HOSPITAL | Age: 33
LOS: 1 days | End: 2023-07-25
Payer: MEDICAID

## 2023-07-25 VITALS
WEIGHT: 121 LBS | SYSTOLIC BLOOD PRESSURE: 103 MMHG | BODY MASS INDEX: 19.44 KG/M2 | DIASTOLIC BLOOD PRESSURE: 59 MMHG | HEIGHT: 66 IN

## 2023-07-25 DIAGNOSIS — Z34.90 ENCOUNTER FOR SUPERVISION OF NORMAL PREGNANCY, UNSPECIFIED, UNSPECIFIED TRIMESTER: ICD-10-CM

## 2023-07-25 PROCEDURE — 99213 OFFICE O/P EST LOW 20 MIN: CPT

## 2023-07-26 ENCOUNTER — APPOINTMENT (OUTPATIENT)
Dept: ANTEPARTUM | Facility: CLINIC | Age: 33
End: 2023-07-26

## 2023-07-26 DIAGNOSIS — Z34.93 ENCOUNTER FOR SUPERVISION OF NORMAL PREGNANCY, UNSPECIFIED, THIRD TRIMESTER: ICD-10-CM

## 2023-07-26 DIAGNOSIS — Z34.83 ENCOUNTER FOR SUPERVISION OF OTHER NORMAL PREGNANCY, THIRD TRIMESTER: ICD-10-CM

## 2023-08-04 ENCOUNTER — OUTPATIENT (OUTPATIENT)
Dept: OUTPATIENT SERVICES | Facility: HOSPITAL | Age: 33
LOS: 1 days | End: 2023-08-04
Payer: MEDICAID

## 2023-08-04 ENCOUNTER — APPOINTMENT (OUTPATIENT)
Dept: ANTEPARTUM | Facility: CLINIC | Age: 33
End: 2023-08-04
Payer: MEDICAID

## 2023-08-04 ENCOUNTER — ASOB RESULT (OUTPATIENT)
Age: 33
End: 2023-08-04

## 2023-08-04 DIAGNOSIS — Z34.93 ENCOUNTER FOR SUPERVISION OF NORMAL PREGNANCY, UNSPECIFIED, THIRD TRIMESTER: ICD-10-CM

## 2023-08-04 DIAGNOSIS — Z34.90 ENCOUNTER FOR SUPERVISION OF NORMAL PREGNANCY, UNSPECIFIED, UNSPECIFIED TRIMESTER: ICD-10-CM

## 2023-08-04 PROCEDURE — 76816 OB US FOLLOW-UP PER FETUS: CPT

## 2023-08-04 PROCEDURE — 76816 OB US FOLLOW-UP PER FETUS: CPT | Mod: 26

## 2023-08-04 PROCEDURE — 85027 COMPLETE CBC AUTOMATED: CPT

## 2023-08-04 PROCEDURE — 87389 HIV-1 AG W/HIV-1&-2 AB AG IA: CPT

## 2023-08-04 PROCEDURE — 86780 TREPONEMA PALLIDUM: CPT

## 2023-08-05 DIAGNOSIS — Z34.93 ENCOUNTER FOR SUPERVISION OF NORMAL PREGNANCY, UNSPECIFIED, THIRD TRIMESTER: ICD-10-CM

## 2023-08-07 ENCOUNTER — NON-APPOINTMENT (OUTPATIENT)
Age: 33
End: 2023-08-07

## 2023-08-09 DIAGNOSIS — Z03.74 ENCOUNTER FOR SUSPECTED PROBLEM WITH FETAL GROWTH RULED OUT: ICD-10-CM

## 2023-08-09 DIAGNOSIS — Z3A.35 35 WEEKS GESTATION OF PREGNANCY: ICD-10-CM

## 2023-08-11 ENCOUNTER — NON-APPOINTMENT (OUTPATIENT)
Age: 33
End: 2023-08-11

## 2023-08-11 ENCOUNTER — OUTPATIENT (OUTPATIENT)
Dept: OUTPATIENT SERVICES | Facility: HOSPITAL | Age: 33
LOS: 1 days | End: 2023-08-11
Payer: MEDICAID

## 2023-08-11 ENCOUNTER — APPOINTMENT (OUTPATIENT)
Dept: OBGYN | Facility: CLINIC | Age: 33
End: 2023-08-11
Payer: MEDICAID

## 2023-08-11 VITALS
BODY MASS INDEX: 19.77 KG/M2 | SYSTOLIC BLOOD PRESSURE: 111 MMHG | DIASTOLIC BLOOD PRESSURE: 63 MMHG | HEIGHT: 66 IN | WEIGHT: 123 LBS

## 2023-08-11 DIAGNOSIS — Z34.90 ENCOUNTER FOR SUPERVISION OF NORMAL PREGNANCY, UNSPECIFIED, UNSPECIFIED TRIMESTER: ICD-10-CM

## 2023-08-11 DIAGNOSIS — Z34.93 ENCOUNTER FOR SUPERVISION OF NORMAL PREGNANCY, UNSPECIFIED, THIRD TRIMESTER: ICD-10-CM

## 2023-08-11 PROCEDURE — 87491 CHLMYD TRACH DNA AMP PROBE: CPT

## 2023-08-11 PROCEDURE — 87653 STREP B DNA AMP PROBE: CPT

## 2023-08-11 PROCEDURE — 99213 OFFICE O/P EST LOW 20 MIN: CPT

## 2023-08-11 PROCEDURE — 87591 N.GONORRHOEAE DNA AMP PROB: CPT

## 2023-08-11 PROCEDURE — 81002 URINALYSIS NONAUTO W/O SCOPE: CPT

## 2023-08-13 LAB
BILIRUB UR QL STRIP: NORMAL
C TRACH RRNA SPEC QL NAA+PROBE: NOT DETECTED
CLARITY UR: CLEAR
COLLECTION METHOD: NORMAL
GLUCOSE UR-MCNC: NORMAL
GP B STREP DNA SPEC QL NAA+PROBE: NOT DETECTED
HCG UR QL: NORMAL EU/DL
HGB UR QL STRIP.AUTO: NORMAL
HIV1+2 AB SPEC QL IA.RAPID: NONREACTIVE
KETONES UR-MCNC: NORMAL
LEUKOCYTE ESTERASE UR QL STRIP: NORMAL
N GONORRHOEA RRNA SPEC QL NAA+PROBE: NOT DETECTED
NITRITE UR QL STRIP: NORMAL
PH UR STRIP: 7
PROT UR STRIP-MCNC: NORMAL
SOURCE AMPLIFICATION: NORMAL
SOURCE GBS: NORMAL
SP GR UR STRIP: NORMAL
T PALLIDUM AB SER QL IA: NEGATIVE

## 2023-08-15 DIAGNOSIS — Z00.00 ENCOUNTER FOR GENERAL ADULT MEDICAL EXAMINATION WITHOUT ABNORMAL FINDINGS: ICD-10-CM

## 2023-08-15 DIAGNOSIS — Z34.93 ENCOUNTER FOR SUPERVISION OF NORMAL PREGNANCY, UNSPECIFIED, THIRD TRIMESTER: ICD-10-CM

## 2023-08-17 ENCOUNTER — NON-APPOINTMENT (OUTPATIENT)
Age: 33
End: 2023-08-17

## 2023-08-18 ENCOUNTER — APPOINTMENT (OUTPATIENT)
Dept: OBGYN | Facility: CLINIC | Age: 33
End: 2023-08-18

## 2023-08-18 VITALS
HEIGHT: 66 IN | TEMPERATURE: 97.5 F | SYSTOLIC BLOOD PRESSURE: 119 MMHG | BODY MASS INDEX: 19.77 KG/M2 | DIASTOLIC BLOOD PRESSURE: 72 MMHG | WEIGHT: 123 LBS

## 2023-08-20 LAB
BILIRUB UR QL STRIP: NORMAL
CLARITY UR: CLEAR
COLLECTION METHOD: NORMAL
GLUCOSE UR-MCNC: NORMAL
HCG UR QL: NORMAL EU/DL
HGB UR QL STRIP.AUTO: NORMAL
KETONES UR-MCNC: NORMAL
LEUKOCYTE ESTERASE UR QL STRIP: NORMAL
NITRITE UR QL STRIP: NORMAL
PH UR STRIP: NORMAL
PROT UR STRIP-MCNC: NORMAL
SP GR UR STRIP: NORMAL

## 2023-08-25 ENCOUNTER — NON-APPOINTMENT (OUTPATIENT)
Age: 33
End: 2023-08-25

## 2023-08-25 ENCOUNTER — RESULT CHARGE (OUTPATIENT)
Age: 33
End: 2023-08-25

## 2023-08-25 ENCOUNTER — APPOINTMENT (OUTPATIENT)
Dept: OBGYN | Facility: CLINIC | Age: 33
End: 2023-08-25
Payer: MEDICAID

## 2023-08-25 ENCOUNTER — APPOINTMENT (OUTPATIENT)
Dept: OBGYN | Facility: CLINIC | Age: 33
End: 2023-08-25

## 2023-08-25 ENCOUNTER — OUTPATIENT (OUTPATIENT)
Dept: OUTPATIENT SERVICES | Facility: HOSPITAL | Age: 33
LOS: 1 days | End: 2023-08-25
Payer: MEDICAID

## 2023-08-25 VITALS
HEIGHT: 66 IN | BODY MASS INDEX: 19.61 KG/M2 | TEMPERATURE: 97.9 F | SYSTOLIC BLOOD PRESSURE: 120 MMHG | WEIGHT: 122 LBS | DIASTOLIC BLOOD PRESSURE: 80 MMHG

## 2023-08-25 DIAGNOSIS — Z34.01 ENCOUNTER FOR SUPERVISION OF NORMAL FIRST PREGNANCY, FIRST TRIMESTER: ICD-10-CM

## 2023-08-25 DIAGNOSIS — Z34.90 ENCOUNTER FOR SUPERVISION OF NORMAL PREGNANCY, UNSPECIFIED, UNSPECIFIED TRIMESTER: ICD-10-CM

## 2023-08-25 PROCEDURE — 81002 URINALYSIS NONAUTO W/O SCOPE: CPT

## 2023-08-25 PROCEDURE — 99213 OFFICE O/P EST LOW 20 MIN: CPT

## 2023-08-26 PROBLEM — Z34.01 ENCOUNTER FOR SUPERVISION OF NORMAL FIRST PREGNANCY IN FIRST TRIMESTER: Status: ACTIVE | Noted: 2023-01-24

## 2023-08-30 ENCOUNTER — NON-APPOINTMENT (OUTPATIENT)
Age: 33
End: 2023-08-30

## 2023-08-30 ENCOUNTER — OUTPATIENT (OUTPATIENT)
Dept: INPATIENT UNIT | Facility: HOSPITAL | Age: 33
LOS: 1 days | Discharge: HOME | End: 2023-08-30
Payer: MEDICAID

## 2023-08-30 VITALS
DIASTOLIC BLOOD PRESSURE: 89 MMHG | HEART RATE: 75 BPM | TEMPERATURE: 98 F | RESPIRATION RATE: 18 BRPM | SYSTOLIC BLOOD PRESSURE: 133 MMHG

## 2023-08-30 VITALS — SYSTOLIC BLOOD PRESSURE: 132 MMHG | HEART RATE: 75 BPM | DIASTOLIC BLOOD PRESSURE: 89 MMHG

## 2023-08-30 DIAGNOSIS — O47.1 FALSE LABOR AT OR AFTER 37 COMPLETED WEEKS OF GESTATION: ICD-10-CM

## 2023-08-30 DIAGNOSIS — O26.893 OTHER SPECIFIED PREGNANCY RELATED CONDITIONS, THIRD TRIMESTER: ICD-10-CM

## 2023-08-30 DIAGNOSIS — Z3A.00 WEEKS OF GESTATION OF PREGNANCY NOT SPECIFIED: ICD-10-CM

## 2023-08-30 DIAGNOSIS — Z34.01 ENCOUNTER FOR SUPERVISION OF NORMAL FIRST PREGNANCY, FIRST TRIMESTER: ICD-10-CM

## 2023-08-30 DIAGNOSIS — O26.899 OTHER SPECIFIED PREGNANCY RELATED CONDITIONS, UNSPECIFIED TRIMESTER: ICD-10-CM

## 2023-08-30 PROCEDURE — 99215 OFFICE O/P EST HI 40 MIN: CPT | Mod: 25

## 2023-08-30 PROCEDURE — 99214 OFFICE O/P EST MOD 30 MIN: CPT

## 2023-08-30 PROCEDURE — 76815 OB US LIMITED FETUS(S): CPT | Mod: 26

## 2023-08-30 PROCEDURE — 59025 FETAL NON-STRESS TEST: CPT | Mod: 26

## 2023-08-30 PROCEDURE — 99417 PROLNG OP E/M EACH 15 MIN: CPT | Mod: 25

## 2023-08-30 NOTE — OB PROVIDER TRIAGE NOTE - NS_OBGYNHISTORY_OBGYN_ALL_OB_FT
OBHx:   FT,  x 1 6lbs, no complications    Gyn Hx: Denies h/o of cysts, fibroids, abnormal paps, and STIs.

## 2023-08-30 NOTE — OB PROVIDER TRIAGE NOTE - NSHPPHYSICALEXAM_GEN_ALL_CORE
Vital Signs Last 24 Hrs  T(C): 36.7 (30 Aug 2023 11:02), Max: 36.8 (30 Aug 2023 11:00)  T(F): 98.1 (30 Aug 2023 11:02), Max: 98.2 (30 Aug 2023 11:00)  HR: 75 (30 Aug 2023 11:02) (75 - 75)  BP: 133/89 (30 Aug 2023 11:02) (132/89 - 133/89)  RR: 18 (30 Aug 2023 11:02) (18 - 18)    General: AAOx3, NAD  Abdomen: Soft, nontender, gravid  EFM: 150/mod/+accels  toco: q1-3, irregular  SVE: 2/50/-3, vtx exam per Dr. Saxena  BSS: vtx, mvp 3.18cm, anterior placenta

## 2023-08-30 NOTE — OB PROVIDER TRIAGE NOTE - ADDITIONAL INSTRUCTIONS
f/u at your appointment at [facility] this Friday  If you have leakage of fluid, vaginal bleeding, feel contractions getting stronger and closer together or feel your baby move less, call your doctor and come to Labor & Delivery.

## 2023-08-30 NOTE — OB PROVIDER TRIAGE NOTE - HISTORY OF PRESENT ILLNESS
31 y/o  at 39w3d CANDIS 9/3/23 by LMP c/w first trimester sonogram presents w/ R and L sided abdominal pain with side-laying and back pain that began yesterday around 1800. Pain is only with movement and side-laying, 4/10 severity. Denies fever, chills, N/V, diarrhea. Denies LOF, vaginal bleeding. Feels irregular contractions greater than 30 min apart, not painful. Good fetal movement. No complications during pregnancy or with previous pregnancy. SVE in office  1cm/30. GBS neg.  31 y/o  at 39w3d CANDIS 9/3/23 by LMP c/w first trimester sonogram presents w/ R and L sided abdominal pain with side-laying and back pain that began yesterday around 1800. Pain is only with movement and side-laying, 4/10 severity. Denies fever, chills, N/V, diarrhea. Denies LOF, vaginal bleeding. Has not tried taking anything for pain. Feels irregular contractions greater than 30 min apart, not painful. Good fetal movement. No complications during pregnancy or with previous pregnancy. SVE in office  1cm/30. GBS neg.

## 2023-08-30 NOTE — OB PROVIDER TRIAGE NOTE - ATTENDING COMMENTS
I was physically present for the key portions of the evaluation and management (e/m) service provided. I agree with the above history, physical and plan which I have reviewed and edited where appropriate  Patient seen face to face and case reviewed, precautions given to patient    Patient presented to triage-I met the patient and started my evaluation at 1103. The fetal heart rate monitor ended 1201. I spent 58 minutes caring for the patient. It included obtaining a history, performing of multiple examinations, continuously monitoring the fetus and interpretation of the fetal heart rate strip, ordering and reviewing labs-serial sets , documenting in the medical record and multiple discussions with the patient    33 y/o  @ 39w3d, with abdominal pain, not in labor. Reassuring maternal and fetal status. D/C home with precautions. Follow up at scheduled outpatient appointment.

## 2023-08-30 NOTE — OB PROVIDER TRIAGE NOTE - NSHPLABSRESULTS_GEN_ALL_CORE
1/24/23  Blood type: AB pos, anti neg  HBsAg: NR  HIV: Neg  Rubella: Immune  Measles: Non Immune  RPR: Negative  Gonorrhea: Neg  Chlamydia: Neg  Varicella: Immune  CF: Neg  SMA: Neg  Pap smear: NILM    lead <1.0    GCT: 105    ANEUPLOIDY SCREENING: Negative    8/4/23  TrepAb neg    8/11/23  GBS neg    SONOGRAMS:   35w5d - EFW 2642 EFW 38%ile, AC 35%ile, mvp 6.66cm, , vtx, anterior placenta   5/4/2023: 22.3 weeks:  gms, breech, ant placenta, MAURICE 6.11, CL 3.46 cm, no major fetal malformations noted  3/22/2023: 16.3 weeks: , ant placenta, MAURICE 4.21 cm  2/24/2023: 12.5 weeks: breech, ant placenta, NT 2.72 mm (94%)    4/24/2023: Duplex: Negative

## 2023-08-30 NOTE — OB RN TRIAGE NOTE - FALL HARM RISK - UNIVERSAL INTERVENTIONS
Bed in lowest position, wheels locked, appropriate side rails in place/Call bell, personal items and telephone in reach/Instruct patient to call for assistance before getting out of bed or chair/Non-slip footwear when patient is out of bed/[place] to call system/Physically safe environment - no spills, clutter or unnecessary equipment/Purposeful Proactive Rounding/Room/bathroom lighting operational, light cord in reach

## 2023-08-30 NOTE — OB PROVIDER TRIAGE NOTE - NSOBPROVIDERNOTE_OBGYN_ALL_OB_FT
33 y/o  at 39w3d CANDIS 9/3/23 by LMP c/w first trimester sonogram, GBS neg, here for b/l abdominal pain and back pain, likely musculoskeletal, reassuring maternal and fetal status.     - reactive NST  - encouraged PO hydration  - labor precautions discussed  - f/u for [facility] appointment this Friday    Dr. Saxena and Dr. Arrington aware.

## 2023-09-01 ENCOUNTER — NON-APPOINTMENT (OUTPATIENT)
Age: 33
End: 2023-09-01

## 2023-09-01 ENCOUNTER — APPOINTMENT (OUTPATIENT)
Dept: OBGYN | Facility: CLINIC | Age: 33
End: 2023-09-01
Payer: MEDICAID

## 2023-09-01 ENCOUNTER — INPATIENT (INPATIENT)
Facility: HOSPITAL | Age: 33
LOS: 2 days | Discharge: HOME | DRG: 560 | End: 2023-09-04
Attending: STUDENT IN AN ORGANIZED HEALTH CARE EDUCATION/TRAINING PROGRAM | Admitting: STUDENT IN AN ORGANIZED HEALTH CARE EDUCATION/TRAINING PROGRAM
Payer: MEDICAID

## 2023-09-01 ENCOUNTER — OUTPATIENT (OUTPATIENT)
Dept: OUTPATIENT SERVICES | Facility: HOSPITAL | Age: 33
LOS: 1 days | End: 2023-09-01
Payer: MEDICAID

## 2023-09-01 VITALS — SYSTOLIC BLOOD PRESSURE: 145 MMHG | DIASTOLIC BLOOD PRESSURE: 97 MMHG | HEART RATE: 73 BPM

## 2023-09-01 VITALS
WEIGHT: 121.13 LBS | SYSTOLIC BLOOD PRESSURE: 131 MMHG | BODY MASS INDEX: 19.55 KG/M2 | DIASTOLIC BLOOD PRESSURE: 82 MMHG

## 2023-09-01 DIAGNOSIS — O42.92 FULL-TERM PREMATURE RUPTURE OF MEMBRANES, UNSPECIFIED AS TO LENGTH OF TIME BETWEEN RUPTURE AND ONSET OF LABOR: ICD-10-CM

## 2023-09-01 DIAGNOSIS — O26.899 OTHER SPECIFIED PREGNANCY RELATED CONDITIONS, UNSPECIFIED TRIMESTER: ICD-10-CM

## 2023-09-01 DIAGNOSIS — Z34.90 ENCOUNTER FOR SUPERVISION OF NORMAL PREGNANCY, UNSPECIFIED, UNSPECIFIED TRIMESTER: ICD-10-CM

## 2023-09-01 DIAGNOSIS — Z3A.00 WEEKS OF GESTATION OF PREGNANCY NOT SPECIFIED: ICD-10-CM

## 2023-09-01 LAB
ALBUMIN SERPL ELPH-MCNC: 3.6 G/DL — SIGNIFICANT CHANGE UP (ref 3.5–5.2)
ALP SERPL-CCNC: 328 U/L — HIGH (ref 30–115)
ALT FLD-CCNC: 16 U/L — SIGNIFICANT CHANGE UP (ref 0–41)
ANION GAP SERPL CALC-SCNC: 11 MMOL/L — SIGNIFICANT CHANGE UP (ref 7–14)
APPEARANCE UR: ABNORMAL
APTT BLD: 29.7 SEC — SIGNIFICANT CHANGE UP (ref 27–39.2)
AST SERPL-CCNC: 27 U/L — SIGNIFICANT CHANGE UP (ref 0–41)
BACTERIA # UR AUTO: ABNORMAL /HPF
BASOPHILS # BLD AUTO: 0.02 K/UL — SIGNIFICANT CHANGE UP (ref 0–0.2)
BASOPHILS NFR BLD AUTO: 0.3 % — SIGNIFICANT CHANGE UP (ref 0–1)
BILIRUB SERPL-MCNC: 0.3 MG/DL — SIGNIFICANT CHANGE UP (ref 0.2–1.2)
BILIRUB UR-MCNC: NEGATIVE — SIGNIFICANT CHANGE UP
BLD GP AB SCN SERPL QL: SIGNIFICANT CHANGE UP
BUN SERPL-MCNC: 14 MG/DL — SIGNIFICANT CHANGE UP (ref 10–20)
CALCIUM SERPL-MCNC: 9.6 MG/DL — SIGNIFICANT CHANGE UP (ref 8.4–10.5)
CAST: 0 /LPF — SIGNIFICANT CHANGE UP (ref 0–4)
CHLORIDE SERPL-SCNC: 102 MMOL/L — SIGNIFICANT CHANGE UP (ref 98–110)
CO2 SERPL-SCNC: 23 MMOL/L — SIGNIFICANT CHANGE UP (ref 17–32)
COLOR SPEC: YELLOW — SIGNIFICANT CHANGE UP
CREAT SERPL-MCNC: 0.8 MG/DL — SIGNIFICANT CHANGE UP (ref 0.7–1.5)
DIFF PNL FLD: ABNORMAL
EGFR: 102 ML/MIN/1.73M2 — SIGNIFICANT CHANGE UP
EOSINOPHIL # BLD AUTO: 0.07 K/UL — SIGNIFICANT CHANGE UP (ref 0–0.7)
EOSINOPHIL NFR BLD AUTO: 0.9 % — SIGNIFICANT CHANGE UP (ref 0–8)
FIBRINOGEN PPP-MCNC: 410 MG/DL — SIGNIFICANT CHANGE UP (ref 204.4–570.6)
GLUCOSE SERPL-MCNC: 85 MG/DL — SIGNIFICANT CHANGE UP (ref 70–99)
GLUCOSE UR QL: NEGATIVE MG/DL — SIGNIFICANT CHANGE UP
HCT VFR BLD CALC: 43 % — SIGNIFICANT CHANGE UP (ref 37–47)
HGB BLD-MCNC: 14.3 G/DL — SIGNIFICANT CHANGE UP (ref 12–16)
HYALINE CASTS # UR AUTO: 0 /LPF — SIGNIFICANT CHANGE UP (ref 0–4)
IMM GRANULOCYTES NFR BLD AUTO: 0.5 % — HIGH (ref 0.1–0.3)
INR BLD: 0.81 RATIO — SIGNIFICANT CHANGE UP (ref 0.65–1.3)
KETONES UR-MCNC: NEGATIVE MG/DL — SIGNIFICANT CHANGE UP
LDH SERPL L TO P-CCNC: 256 — HIGH (ref 50–242)
LEUKOCYTE ESTERASE UR-ACNC: ABNORMAL
LYMPHOCYTES # BLD AUTO: 2.25 K/UL — SIGNIFICANT CHANGE UP (ref 1.2–3.4)
LYMPHOCYTES # BLD AUTO: 28.8 % — SIGNIFICANT CHANGE UP (ref 20.5–51.1)
MCHC RBC-ENTMCNC: 28.2 PG — SIGNIFICANT CHANGE UP (ref 27–31)
MCHC RBC-ENTMCNC: 33.3 G/DL — SIGNIFICANT CHANGE UP (ref 32–37)
MCV RBC AUTO: 84.8 FL — SIGNIFICANT CHANGE UP (ref 81–99)
MONOCYTES # BLD AUTO: 0.49 K/UL — SIGNIFICANT CHANGE UP (ref 0.1–0.6)
MONOCYTES NFR BLD AUTO: 6.3 % — SIGNIFICANT CHANGE UP (ref 1.7–9.3)
NEUTROPHILS # BLD AUTO: 4.93 K/UL — SIGNIFICANT CHANGE UP (ref 1.4–6.5)
NEUTROPHILS NFR BLD AUTO: 63.2 % — SIGNIFICANT CHANGE UP (ref 42.2–75.2)
NITRITE UR-MCNC: NEGATIVE — SIGNIFICANT CHANGE UP
NRBC # BLD: 0 /100 WBCS — SIGNIFICANT CHANGE UP (ref 0–0)
PH UR: 7 — SIGNIFICANT CHANGE UP (ref 5–8)
PLATELET # BLD AUTO: 164 K/UL — SIGNIFICANT CHANGE UP (ref 130–400)
PMV BLD: 12.3 FL — HIGH (ref 7.4–10.4)
POTASSIUM SERPL-MCNC: 4.1 MMOL/L — SIGNIFICANT CHANGE UP (ref 3.5–5)
POTASSIUM SERPL-SCNC: 4.1 MMOL/L — SIGNIFICANT CHANGE UP (ref 3.5–5)
PRENATAL SYPHILIS TEST: SIGNIFICANT CHANGE UP
PROT SERPL-MCNC: 7 G/DL — SIGNIFICANT CHANGE UP (ref 6–8)
PROT UR-MCNC: NEGATIVE MG/DL — SIGNIFICANT CHANGE UP
PROTHROM AB SERPL-ACNC: 9.2 SEC — LOW (ref 9.95–12.87)
RBC # BLD: 5.07 M/UL — SIGNIFICANT CHANGE UP (ref 4.2–5.4)
RBC # FLD: 13.8 % — SIGNIFICANT CHANGE UP (ref 11.5–14.5)
RBC CASTS # UR COMP ASSIST: 0 /HPF — SIGNIFICANT CHANGE UP (ref 0–4)
SODIUM SERPL-SCNC: 136 MMOL/L — SIGNIFICANT CHANGE UP (ref 135–146)
SP GR SPEC: <1.005 — LOW (ref 1–1.03)
SQUAMOUS # UR AUTO: 4 /HPF — SIGNIFICANT CHANGE UP (ref 0–5)
URATE SERPL-MCNC: 7.6 MG/DL — HIGH (ref 2.5–7)
UROBILINOGEN FLD QL: 0.2 MG/DL — SIGNIFICANT CHANGE UP (ref 0.2–1)
WBC # BLD: 7.8 K/UL — SIGNIFICANT CHANGE UP (ref 4.8–10.8)
WBC # FLD AUTO: 7.8 K/UL — SIGNIFICANT CHANGE UP (ref 4.8–10.8)
WBC UR QL: 38 /HPF — HIGH (ref 0–5)

## 2023-09-01 PROCEDURE — 81001 URINALYSIS AUTO W/SCOPE: CPT

## 2023-09-01 PROCEDURE — 99213 OFFICE O/P EST LOW 20 MIN: CPT

## 2023-09-01 PROCEDURE — 85025 COMPLETE CBC W/AUTO DIFF WBC: CPT

## 2023-09-01 PROCEDURE — 59409 OBSTETRICAL CARE: CPT | Mod: U9

## 2023-09-01 PROCEDURE — 85384 FIBRINOGEN ACTIVITY: CPT

## 2023-09-01 PROCEDURE — 59050 FETAL MONITOR W/REPORT: CPT

## 2023-09-01 PROCEDURE — 36415 COLL VENOUS BLD VENIPUNCTURE: CPT

## 2023-09-01 PROCEDURE — 82570 ASSAY OF URINE CREATININE: CPT

## 2023-09-01 PROCEDURE — 85610 PROTHROMBIN TIME: CPT

## 2023-09-01 PROCEDURE — 86850 RBC ANTIBODY SCREEN: CPT

## 2023-09-01 PROCEDURE — 85730 THROMBOPLASTIN TIME PARTIAL: CPT

## 2023-09-01 PROCEDURE — 83615 LACTATE (LD) (LDH) ENZYME: CPT

## 2023-09-01 PROCEDURE — 83735 ASSAY OF MAGNESIUM: CPT

## 2023-09-01 PROCEDURE — 80053 COMPREHEN METABOLIC PANEL: CPT

## 2023-09-01 PROCEDURE — 86900 BLOOD TYPING SEROLOGIC ABO: CPT

## 2023-09-01 PROCEDURE — 80307 DRUG TEST PRSMV CHEM ANLYZR: CPT

## 2023-09-01 PROCEDURE — 84156 ASSAY OF PROTEIN URINE: CPT

## 2023-09-01 PROCEDURE — 86901 BLOOD TYPING SEROLOGIC RH(D): CPT

## 2023-09-01 PROCEDURE — 84550 ASSAY OF BLOOD/URIC ACID: CPT

## 2023-09-01 PROCEDURE — 86592 SYPHILIS TEST NON-TREP QUAL: CPT

## 2023-09-01 RX ORDER — TETANUS TOXOID, REDUCED DIPHTHERIA TOXOID AND ACELLULAR PERTUSSIS VACCINE, ADSORBED 5; 2.5; 8; 8; 2.5 [IU]/.5ML; [IU]/.5ML; UG/.5ML; UG/.5ML; UG/.5ML
0.5 SUSPENSION INTRAMUSCULAR ONCE
Refills: 0 | Status: DISCONTINUED | OUTPATIENT
Start: 2023-09-01 | End: 2023-09-04

## 2023-09-01 RX ORDER — KETOROLAC TROMETHAMINE 30 MG/ML
30 SYRINGE (ML) INJECTION ONCE
Refills: 0 | Status: DISCONTINUED | OUTPATIENT
Start: 2023-09-01 | End: 2023-09-01

## 2023-09-01 RX ORDER — OXYTOCIN 10 UNIT/ML
333.33 VIAL (ML) INJECTION
Qty: 20 | Refills: 0 | Status: DISCONTINUED | OUTPATIENT
Start: 2023-09-01 | End: 2023-09-01

## 2023-09-01 RX ORDER — HYDROCORTISONE 1 %
1 OINTMENT (GRAM) TOPICAL EVERY 6 HOURS
Refills: 0 | Status: DISCONTINUED | OUTPATIENT
Start: 2023-09-01 | End: 2023-09-04

## 2023-09-01 RX ORDER — CEFAZOLIN SODIUM 1 G
2000 VIAL (EA) INJECTION ONCE
Refills: 0 | Status: DISCONTINUED | OUTPATIENT
Start: 2023-09-01 | End: 2023-09-01

## 2023-09-01 RX ORDER — ACETAMINOPHEN 500 MG
975 TABLET ORAL
Refills: 0 | Status: DISCONTINUED | OUTPATIENT
Start: 2023-09-01 | End: 2023-09-04

## 2023-09-01 RX ORDER — SODIUM CHLORIDE 9 MG/ML
1000 INJECTION, SOLUTION INTRAVENOUS
Refills: 0 | Status: DISCONTINUED | OUTPATIENT
Start: 2023-09-01 | End: 2023-09-01

## 2023-09-01 RX ORDER — AZITHROMYCIN 500 MG/1
500 TABLET, FILM COATED ORAL ONCE
Refills: 0 | Status: DISCONTINUED | OUTPATIENT
Start: 2023-09-01 | End: 2023-09-01

## 2023-09-01 RX ORDER — MAGNESIUM HYDROXIDE 400 MG/1
30 TABLET, CHEWABLE ORAL
Refills: 0 | Status: DISCONTINUED | OUTPATIENT
Start: 2023-09-01 | End: 2023-09-04

## 2023-09-01 RX ORDER — ACETAMINOPHEN 500 MG
1000 TABLET ORAL ONCE
Refills: 0 | Status: COMPLETED | OUTPATIENT
Start: 2023-09-01 | End: 2023-09-01

## 2023-09-01 RX ORDER — CHLORHEXIDINE GLUCONATE 213 G/1000ML
1 SOLUTION TOPICAL DAILY
Refills: 0 | Status: DISCONTINUED | OUTPATIENT
Start: 2023-09-01 | End: 2023-09-01

## 2023-09-01 RX ORDER — OXYCODONE HYDROCHLORIDE 5 MG/1
5 TABLET ORAL ONCE
Refills: 0 | Status: DISCONTINUED | OUTPATIENT
Start: 2023-09-01 | End: 2023-09-04

## 2023-09-01 RX ORDER — DIBUCAINE 1 %
1 OINTMENT (GRAM) RECTAL EVERY 6 HOURS
Refills: 0 | Status: DISCONTINUED | OUTPATIENT
Start: 2023-09-01 | End: 2023-09-04

## 2023-09-01 RX ORDER — LANOLIN
1 OINTMENT (GRAM) TOPICAL EVERY 6 HOURS
Refills: 0 | Status: DISCONTINUED | OUTPATIENT
Start: 2023-09-01 | End: 2023-09-04

## 2023-09-01 RX ORDER — INFLUENZA VIRUS VACCINE 15; 15; 15; 15 UG/.5ML; UG/.5ML; UG/.5ML; UG/.5ML
0.5 SUSPENSION INTRAMUSCULAR ONCE
Refills: 0 | Status: DISCONTINUED | OUTPATIENT
Start: 2023-09-01 | End: 2023-09-01

## 2023-09-01 RX ORDER — SIMETHICONE 80 MG/1
80 TABLET, CHEWABLE ORAL EVERY 4 HOURS
Refills: 0 | Status: DISCONTINUED | OUTPATIENT
Start: 2023-09-01 | End: 2023-09-04

## 2023-09-01 RX ORDER — SODIUM CHLORIDE 9 MG/ML
3 INJECTION INTRAMUSCULAR; INTRAVENOUS; SUBCUTANEOUS EVERY 8 HOURS
Refills: 0 | Status: DISCONTINUED | OUTPATIENT
Start: 2023-09-01 | End: 2023-09-04

## 2023-09-01 RX ORDER — DIPHENHYDRAMINE HCL 50 MG
25 CAPSULE ORAL EVERY 6 HOURS
Refills: 0 | Status: DISCONTINUED | OUTPATIENT
Start: 2023-09-01 | End: 2023-09-04

## 2023-09-01 RX ORDER — BENZOCAINE 10 %
1 GEL (GRAM) MUCOUS MEMBRANE EVERY 6 HOURS
Refills: 0 | Status: DISCONTINUED | OUTPATIENT
Start: 2023-09-01 | End: 2023-09-04

## 2023-09-01 RX ORDER — MAGNESIUM SULFATE 500 MG/ML
4 VIAL (ML) INJECTION ONCE
Refills: 0 | Status: COMPLETED | OUTPATIENT
Start: 2023-09-01 | End: 2023-09-01

## 2023-09-01 RX ORDER — MAGNESIUM SULFATE 500 MG/ML
2 VIAL (ML) INJECTION
Qty: 40 | Refills: 0 | Status: DISCONTINUED | OUTPATIENT
Start: 2023-09-01 | End: 2023-09-02

## 2023-09-01 RX ORDER — OXYTOCIN 10 UNIT/ML
41.67 VIAL (ML) INJECTION
Qty: 20 | Refills: 0 | Status: DISCONTINUED | OUTPATIENT
Start: 2023-09-01 | End: 2023-09-04

## 2023-09-01 RX ORDER — IBUPROFEN 200 MG
600 TABLET ORAL EVERY 6 HOURS
Refills: 0 | Status: COMPLETED | OUTPATIENT
Start: 2023-09-01 | End: 2024-07-30

## 2023-09-01 RX ORDER — AER TRAVELER 0.5 G/1
1 SOLUTION RECTAL; TOPICAL EVERY 4 HOURS
Refills: 0 | Status: DISCONTINUED | OUTPATIENT
Start: 2023-09-01 | End: 2023-09-04

## 2023-09-01 RX ORDER — CARBOPROST TROMETHAMINE 250 UG/ML
250 INJECTION, SOLUTION INTRAMUSCULAR ONCE
Refills: 0 | Status: COMPLETED | OUTPATIENT
Start: 2023-09-01 | End: 2023-09-01

## 2023-09-01 RX ORDER — HYDRALAZINE HCL 50 MG
5 TABLET ORAL ONCE
Refills: 0 | Status: COMPLETED | OUTPATIENT
Start: 2023-09-01 | End: 2023-09-01

## 2023-09-01 RX ORDER — OXYCODONE HYDROCHLORIDE 5 MG/1
5 TABLET ORAL
Refills: 0 | Status: DISCONTINUED | OUTPATIENT
Start: 2023-09-01 | End: 2023-09-04

## 2023-09-01 RX ADMIN — Medication 30 MILLIGRAM(S): at 23:31

## 2023-09-01 RX ADMIN — CARBOPROST TROMETHAMINE 250 MICROGRAM(S): 250 INJECTION, SOLUTION INTRAMUSCULAR at 23:07

## 2023-09-01 RX ADMIN — Medication 300 GRAM(S): at 23:53

## 2023-09-01 RX ADMIN — Medication 400 MILLIGRAM(S): at 23:33

## 2023-09-01 RX ADMIN — Medication 5 MILLIGRAM(S): at 23:43

## 2023-09-01 NOTE — OB PROVIDER H&P - NSHPLABSRESULTS_GEN_ALL_CORE
35w5d: vertex, anterior, MVP 6.66cm, 2642gm (38%)  22w3d: breech, anterior, eccentric cord insertion, MVP 6.11cm, 599gm (90%), CL 3.46cm  16w3d: varaible, anterior, MVP 4.21cm, 198gm, anatomy wnl  12w5d: breech, anterior, CRL 72.94mm, NT 2.72mm

## 2023-09-01 NOTE — OB PROVIDER DELIVERY SUMMARY - NSPROVIDERDELIVERYNOTE_OBGYN_ALL_OB_FT
Patient was fully dilated and pushing. Fetal head was OA, Due to fetal bradycardia decision was made to apply a kiwi vaccum to expedite delivery. RML was cut. Vaccum was applied 2cm anterior to the posterior fontanelle, suction engaged to a maximum pressure of 70, one pull needed to deliver fetal head. Zero pop offs. Head restituted to ROT. The anterior and posterior shoulders delivered, followed by the remaining body atraumatically. The  was handed to the pediatric team. Delayed cord clamping was performed, and then clamped and cut. Cord blood gases collected x2. The placenta delivered intact with membranes. Pitocin was administered+ hemabate and cytotec. Uterus massaged, fundus found to be firm. Cervix, vagina and perineum inspected no additional lacerations. RML repaired using 2-0 chromic in the usual fashion with good hemostasis.         Lacteration: RML      Dr. Hanson present for the delivery Patient was fully dilated and pushing. Fetal head was OA, Due to fetal bradycardia decision was made to apply a kiwi vacuum to expedite delivery. RML was cut. Vacuum was applied 2cm anterior to the posterior fontanelle, suction engaged to a maximum pressure of 70, one pull needed to deliver fetal head. Zero pop offs. Head restituted to ROT. Nuchal cord x2 noted and reduced. The anterior and posterior shoulders delivered, followed by the remaining body atraumatically. Cord was clamped and cut. The  was handed to the awaiting pediatric team. Cord blood gases collected x2. The placenta delivered intact with membranes. Pitocin was administered+ Hemabate and cytotec for lower segment atony. Uterus massaged, fundus found to be firm. Cervix, vagina and perineum inspected no additional lacerations. RML repaired using 2-0 chromic in the usual fashion with good hemostasis. right vaginal abscess noted to rupture at time of delivery, site noted to be hemostatic during repair.        Laceration: RML      Dr. Hanson present for the delivery

## 2023-09-01 NOTE — OB RN PATIENT PROFILE - SPIRITUAL CULTURAL, CURRENT SITUATION, PROFILE
Quality   Pain left side and rectum  and blood w mucos.  done w scopes w Max    last exam 2010 normal  agrees to do colonoscpy at hospital to eval further.    .  Message   Recorded as Task  Date: 03/14/2017 08:43 AM, Created By: Evangelina Sheppard  Task Name: 4. Patient Message  Assigned To: LAURA HATFIELD  Regarding Patient: HERMINIO CHINO, Status: Active  Comment:   Evangelina Sheppard - 14 Mar 2017 8:43 AM    TASK CREATED  Hi Dr. Hatfield,        Ms. Chino states that she is having a bloody mucus coming out of her and would like to see you. Let me know where you would like for her to be placed on your schedule. She knows that your schedule is far and but wants to see you.  Pt. can be reached at [phone].  Assessment   Hematochezia (578.1) (K92.1).  Abdominal pain (789.00) (R10.9).  Iron deficiency anemia (280.9) (D50.9).  Orders   PEG 3350-KCl-Na Bicarb-NaCl 420 GM Oral Solution Reconstituted;TAKE AS DIRECTED; Qty1; R0; Rx.  Bisacodyl EC 5 MG Oral Tablet Delayed Release;TAKE 2 TABLET AS DIRECTED Take both tablets after completion of the first half of the bowel preparation; Qty2; R1; Rx.  Colonoscopy; Requested for: 14 Mar 2017.  Signature   Electronically signed by : LAURA HATFIELD MD; 03/14/2017 3:42 PM CST.  
none

## 2023-09-01 NOTE — OB RN PATIENT PROFILE - TEACHING/LEARNING FACTORS IMPACT ABILITY TO LEARN
Atrial Fibrillation    CABG (Coronary Artery Bypass Graft)    CAD (Coronary Artery Disease)    Hyperlipidemia    ICD (Implantable Cardiac Defibrillator) in Place    Ischemic Cardiomyopathy    Ventricular Arrhythmia
none

## 2023-09-01 NOTE — OB PROVIDER H&P - NSSCHADMISSION_OBGYN_A_OB
Products Recommended: Elta MD General Sunscreen Counseling: I recommended a broad spectrum sunscreen with a SPF of 30 or higher. I explained that SPF 30 sunscreens block approximately 97 percent of the sun's harmful rays. Sunscreens should be applied at least 15 minutes prior to expected sun exposure and then every 2 hours after that as long as sun exposure continues. If swimming or exercising sunscreen should be reapplied every 45 minutes to an hour after getting wet or sweating. One ounce, or the equivalent of a shot glass full of sunscreen, is adequate to protect the skin not covered by a bathing suit. I also recommended a lip balm with a sunscreen as well. Sun protective clothing can be used in lieu of sunscreen but must be worn the entire time you are exposed to the sun's rays. Detail Level: Detailed No

## 2023-09-01 NOTE — OB PROVIDER H&P - ASSESSMENT
31 yo  @39w5d, GBS neg, measles nonimmune, elevated BP r/o gHTN vs preeclampsia, with category II tracing, in labor  -admit to l&d  -f/u admission labs, PELs, urprcr, UA  -cont efm/toco monitoring  -monitor vitals  -pain management prn  -BPs [id number]  -resuscitation for category II tracing  -MMR postpartum    Dr Hanson aware 33yo  @39w5d, GBS neg, measles nonimmune, elevated BP r/o gHTN vs preeclampsia, with category II tracing, in labor  -admit to l&d  -f/u admission labs, PELs, urprcr, UA  -cont efm/toco monitoring  -monitor vitals  -pain management prn  -BPs [id number]  -resuscitation for category II tracing  -MMR postpartum    Dr Hanson aware

## 2023-09-01 NOTE — OB PROVIDER H&P - HISTORY OF PRESENT ILLNESS
33 yo  @39w5d, CANDIS 9/3/23 dated by LMP c/w first trimester sonogram, presented to L&D complaining of contractions that began this morning, now 3 minutes apart and 7/10 intensity. Denies leakage of fluid, vaginal bleeding. Reports good fetal movement. Was 4cm dilated in the office this morning. Elevated BP on arrival. Denies history of elevated BPs outside of the pregnancy. Reports slightly elevated BPs outpatient. Denies headache, changes in vision, chest pain, SOB, RUQ/epigastric pain, N/V, fevers, chills, diarrhea, LE pain/swelling. GBS negative.

## 2023-09-01 NOTE — OB PROVIDER H&P - NSHPPHYSICALEXAM_GEN_ALL_CORE
Physical exam:    Vital Signs Last 24 Hrs  T(F): 98.4 (01 Sep 2023 22:06), Max: 98.4 (01 Sep 2023 22:06)  HR: 60 (01 Sep 2023 22:17) (60 - 73)  BP: 159/93 (01 Sep 2023 22:17) (145/97 - 167/84)  RR: 17 (01 Sep 2023 22:06) (17 - 17)  SpO2: --    Gen: NAD  Abdomen: soft, gravid, nontender, with palpable contractions    EFM: 145/mod/pos acc/late decels/variable decels  toco: irregular  SVE: 5-6/80/-2, vtx, intact

## 2023-09-01 NOTE — OB PROVIDER H&P - ATTENDING COMMENTS
31yo  @39w5d, GBS neg, measles non-immune, elevated BP r/o gHTN vs preeclampsia, with category II tracing, in labor    Labor management discussed with patient and may include augmentation with mechanical and medical options. IV insertion needed for resuscitative measures. Pain management by anesthesia available as needed. Possibility of vacuum application or episiotomy or  delivery in event of persistent nonreassuring fetal status remote from delivery discussed. Risk of hemorrhage and infection also discussed. All questions answered and patient endorsed understanding.

## 2023-09-01 NOTE — OB RN DELIVERY SUMMARY - NSSELHIDDEN_OBGYN_ALL_OB_FT
[[email or web address]_OB_FT:[id number]_[email or web address]_OB_[email or web address]_[email or web address]_ALL_OB_FT:[id number]],[NS_DeliveryRN_OBGYN_ALL_OB_FT:[id number]]

## 2023-09-01 NOTE — OB PROVIDER DELIVERY SUMMARY - NSSELHIDDEN_OBGYN_ALL_OB_FT
[NS_[email or web address]_OB_FT:[id number]],[NS_[email or web address]_OB_FT:[email or web address]_DeliveryAssist2_OBGYN_ALL_OB_FT:[id number]],[NS_DeliveryRN_OBGYN_ALL_OB_FT:[id number]]

## 2023-09-01 NOTE — OB PROVIDER H&P - NSCHILDCOND1_OBGYN_ALL_OB
Assessment/Plan:    Asthma  Well controlled  On zafirlukast and asmanex  Ventolin PRN   UTD with pneumonia vaccines   Sees Dr Rudolph Wright yearly     Chronic seasonal allergic rhinitis due to pollen  On zafirlukast and asmanex  Follows with an allergist     Hypertensive disorder  Well controlled  Continue amlodipine-benazepril     Anxiety and depression  Stable  Continue wellbutrin     Hyperlipidemia  Check lipid panel   Not on statin     BMI Counseling: Body mass index is 32 53 kg/m²  The BMI is above normal  Nutrition recommendations include reducing portion sizes and decreasing overall calorie intake  Diagnoses and all orders for this visit:    Essential hypertension  -     Comprehensive metabolic panel; Future    Mild persistent asthma without complication    Anxiety and depression  -     buPROPion (WELLBUTRIN SR) 150 mg 12 hr tablet; Take 1 tablet (150 mg total) by mouth daily    Mixed hyperlipidemia  -     Lipid Panel with Direct LDL reflex; Future    Chronic seasonal allergic rhinitis due to pollen    Screening for thyroid disorder  -     TSH, 3rd generation with Free T4 reflex; Future    Encounter for breast cancer screening other than mammogram    Encounter for screening mammogram for breast cancer  -     Mammo screening bilateral w 3d & cad; Future    Screening for deficiency anemia  -     CBC and differential; Future    Benign hypertension  -     amLODIPine-benazepril (LOTREL 5-10) 5-10 MG per capsule; Take 1 capsule by mouth daily          Subjective:      Patient ID: Ras Andrade is a 80 y o  female  Here today to establish care  Bondsjonel Page is doing well  She has had asthma for many years  It has been well controlled with no recent exacerbations  She sees Dr Rudolph Wright yearly  She has springtime allergies that somewhat affected her breathing but is much better  She monitors her blood pressure at home  It runs about 110-120/70's     She had an echo done last year which was normal  She saw cardiology for general check up, no cardiac history  Her cholesterol is up, she has declined medication in the past    She has anxiety and depression  She reports a traumatic childhood  She has seen a counselor in the past, not currently  She is doing well on wellbutrin  She stays active  She does the rowing machine daily  She is not walking as much due to right hip pain and achilles issues  She has been in PT in the past    She has been getting deep tissue massages which has helped          The following portions of the patient's history were reviewed and updated as appropriate: allergies, current medications, past family history, past medical history, past social history, past surgical history and problem list     Review of Systems   Constitutional: Negative for activity change, appetite change, fatigue and unexpected weight change  Eyes: Negative for visual disturbance  Respiratory: Negative for cough, chest tightness, shortness of breath and wheezing  Cardiovascular: Negative for chest pain, palpitations and leg swelling  Gastrointestinal: Negative for abdominal pain, blood in stool, constipation and diarrhea  Genitourinary: Negative for difficulty urinating  Musculoskeletal: Positive for arthralgias  Skin: Negative for rash  Neurological: Negative for dizziness, weakness, light-headedness and headaches  Psychiatric/Behavioral: Negative for decreased concentration, dysphoric mood, sleep disturbance and suicidal ideas  The patient is not nervous/anxious  Objective:      /78   Pulse 76   Temp 97 6 °F (36 4 °C)   Ht 5' 1 5" (1 562 m)   Wt 79 4 kg (175 lb)   SpO2 96%   BMI 32 53 kg/m²          Physical Exam   Constitutional: She is oriented to person, place, and time  She appears well-developed and well-nourished  HENT:   Head: Normocephalic and atraumatic  Right Ear: External ear normal    Left Ear: External ear normal    Eyes: Pupils are equal, round, and reactive to light  Conjunctivae are normal    Neck: No thyromegaly present  Cardiovascular: Normal rate, regular rhythm, normal heart sounds and intact distal pulses  Pulmonary/Chest: Effort normal and breath sounds normal    Abdominal: Soft  Bowel sounds are normal    Musculoskeletal: Normal range of motion  She exhibits no edema  Lymphadenopathy:     She has no cervical adenopathy  Neurological: She is alert and oriented to person, place, and time  Skin: Skin is warm and dry  Psychiatric: She has a normal mood and affect  Her behavior is normal    Vitals reviewed  Living

## 2023-09-01 NOTE — CHART NOTE - NSCHARTNOTEFT_GEN_A_CORE
PGY 3 Note    Patient having severe range BPs after delivery. @2317 169/90, @2318 177/98, @2332 163/96. Hydralazine 5mg IVP administered @1143. Patient reports headache. Denies headache, changes in vision, chest pain, SOB, RUQ/epigastric pain, N/V, fevers, chills, diarrhea, LE pain/swelling. Discussed with patient that she now meets criteria for preeclampsia with severe features and need for magnesium treatment for seizure prophylaxis. Patient amenable.    -Will f/u BP in 20 mins  -neuro check q2hrs  -PELs +Mg ordered for 0600   -seizure precautions     Dr Hanson aware PGY 3 Note    Patient having severe range BPs after delivery. @2317 169/90, @2318 177/98, @2332 163/96. Hydralazine 5mg IVP administered @1143. Patient reports headache. Denies changes in vision, chest pain, SOB, RUQ/epigastric pain, N/V, fevers, chills, diarrhea, LE pain/swelling. Discussed with patient that she now meets criteria for preeclampsia with severe features and need for magnesium treatment for seizure prophylaxis. Patient amenable.    -Will f/u BP in 20 mins  -close urine output  -neuro check q2hrs  -PELs +Mg ordered for 0600   -seizure precautions     Dr Hanson aware    ATTENDING NOTE:  Patient counseled on diagnosis and plan of care  recommendation is for 24hrs of seizure ppx with Magnesium R/B/A discussed   IV antihypertensives PRN   Monitor vitals and labs closely   consider starting oral antihypertensives is BP still elevated at time of next mag check

## 2023-09-01 NOTE — OB RN PATIENT PROFILE - FALL HARM RISK - UNIVERSAL INTERVENTIONS
Bed in lowest position, wheels locked, appropriate side rails in place/Call bell, personal items and telephone in reach/Instruct patient to call for assistance before getting out of bed or chair/Non-slip footwear when patient is out of bed/[place] to call system/Physically safe environment - no spills, clutter or unnecessary equipment/Purposeful Proactive Rounding/Room/bathroom lighting operational, light cord in reach

## 2023-09-02 LAB
ALBUMIN SERPL ELPH-MCNC: 2.8 G/DL — LOW (ref 3.5–5.2)
ALBUMIN SERPL ELPH-MCNC: 2.9 G/DL — LOW (ref 3.5–5.2)
ALBUMIN SERPL ELPH-MCNC: 2.9 G/DL — LOW (ref 3.5–5.2)
ALP SERPL-CCNC: 219 U/L — HIGH (ref 30–115)
ALP SERPL-CCNC: 246 U/L — HIGH (ref 30–115)
ALP SERPL-CCNC: 248 U/L — HIGH (ref 30–115)
ALT FLD-CCNC: 12 U/L — SIGNIFICANT CHANGE UP (ref 0–41)
ALT FLD-CCNC: 13 U/L — SIGNIFICANT CHANGE UP (ref 0–41)
ALT FLD-CCNC: 14 U/L — SIGNIFICANT CHANGE UP (ref 0–41)
AMPHET UR-MCNC: NEGATIVE — SIGNIFICANT CHANGE UP
ANION GAP SERPL CALC-SCNC: 8 MMOL/L — SIGNIFICANT CHANGE UP (ref 7–14)
ANION GAP SERPL CALC-SCNC: 9 MMOL/L — SIGNIFICANT CHANGE UP (ref 7–14)
ANION GAP SERPL CALC-SCNC: 9 MMOL/L — SIGNIFICANT CHANGE UP (ref 7–14)
APTT BLD: 28.4 SEC — SIGNIFICANT CHANGE UP (ref 27–39.2)
APTT BLD: 29.2 SEC — SIGNIFICANT CHANGE UP (ref 27–39.2)
APTT BLD: 30 SEC — SIGNIFICANT CHANGE UP (ref 27–39.2)
AST SERPL-CCNC: 27 U/L — SIGNIFICANT CHANGE UP (ref 0–41)
AST SERPL-CCNC: 30 U/L — SIGNIFICANT CHANGE UP (ref 0–41)
AST SERPL-CCNC: 30 U/L — SIGNIFICANT CHANGE UP (ref 0–41)
BARBITURATES UR SCN-MCNC: NEGATIVE — SIGNIFICANT CHANGE UP
BASOPHILS # BLD AUTO: 0.01 K/UL — SIGNIFICANT CHANGE UP (ref 0–0.2)
BASOPHILS # BLD AUTO: 0.01 K/UL — SIGNIFICANT CHANGE UP (ref 0–0.2)
BASOPHILS # BLD AUTO: 0.02 K/UL — SIGNIFICANT CHANGE UP (ref 0–0.2)
BASOPHILS NFR BLD AUTO: 0.1 % — SIGNIFICANT CHANGE UP (ref 0–1)
BENZODIAZ UR-MCNC: NEGATIVE — SIGNIFICANT CHANGE UP
BILIRUB SERPL-MCNC: 0.2 MG/DL — SIGNIFICANT CHANGE UP (ref 0.2–1.2)
BILIRUB SERPL-MCNC: 0.2 MG/DL — SIGNIFICANT CHANGE UP (ref 0.2–1.2)
BILIRUB SERPL-MCNC: <0.2 MG/DL — SIGNIFICANT CHANGE UP (ref 0.2–1.2)
BUN SERPL-MCNC: 10 MG/DL — SIGNIFICANT CHANGE UP (ref 10–20)
BUN SERPL-MCNC: 8 MG/DL — LOW (ref 10–20)
BUN SERPL-MCNC: 9 MG/DL — LOW (ref 10–20)
BUPRENORPHINE SCREEN, URINE RESULT: NEGATIVE — SIGNIFICANT CHANGE UP
CALCIUM SERPL-MCNC: 7.3 MG/DL — LOW (ref 8.4–10.5)
CALCIUM SERPL-MCNC: 7.4 MG/DL — LOW (ref 8.4–10.5)
CALCIUM SERPL-MCNC: 8.3 MG/DL — LOW (ref 8.4–10.5)
CHLORIDE SERPL-SCNC: 101 MMOL/L — SIGNIFICANT CHANGE UP (ref 98–110)
CHLORIDE SERPL-SCNC: 104 MMOL/L — SIGNIFICANT CHANGE UP (ref 98–110)
CHLORIDE SERPL-SCNC: 104 MMOL/L — SIGNIFICANT CHANGE UP (ref 98–110)
CO2 SERPL-SCNC: 20 MMOL/L — SIGNIFICANT CHANGE UP (ref 17–32)
CO2 SERPL-SCNC: 20 MMOL/L — SIGNIFICANT CHANGE UP (ref 17–32)
CO2 SERPL-SCNC: 21 MMOL/L — SIGNIFICANT CHANGE UP (ref 17–32)
COCAINE METAB.OTHER UR-MCNC: NEGATIVE — SIGNIFICANT CHANGE UP
CREAT ?TM UR-MCNC: 20 MG/DL — SIGNIFICANT CHANGE UP
CREAT SERPL-MCNC: 0.7 MG/DL — SIGNIFICANT CHANGE UP (ref 0.7–1.5)
CREAT SERPL-MCNC: 0.8 MG/DL — SIGNIFICANT CHANGE UP (ref 0.7–1.5)
CREAT SERPL-MCNC: 0.8 MG/DL — SIGNIFICANT CHANGE UP (ref 0.7–1.5)
EGFR: 102 ML/MIN/1.73M2 — SIGNIFICANT CHANGE UP
EGFR: 102 ML/MIN/1.73M2 — SIGNIFICANT CHANGE UP
EGFR: 119 ML/MIN/1.73M2 — SIGNIFICANT CHANGE UP
EOSINOPHIL # BLD AUTO: 0.01 K/UL — SIGNIFICANT CHANGE UP (ref 0–0.7)
EOSINOPHIL # BLD AUTO: 0.01 K/UL — SIGNIFICANT CHANGE UP (ref 0–0.7)
EOSINOPHIL # BLD AUTO: 0.06 K/UL — SIGNIFICANT CHANGE UP (ref 0–0.7)
EOSINOPHIL NFR BLD AUTO: 0.1 % — SIGNIFICANT CHANGE UP (ref 0–8)
EOSINOPHIL NFR BLD AUTO: 0.1 % — SIGNIFICANT CHANGE UP (ref 0–8)
EOSINOPHIL NFR BLD AUTO: 0.6 % — SIGNIFICANT CHANGE UP (ref 0–8)
FIBRINOGEN PPP-MCNC: 360 MG/DL — SIGNIFICANT CHANGE UP (ref 204.4–570.6)
FIBRINOGEN PPP-MCNC: 365 MG/DL — SIGNIFICANT CHANGE UP (ref 204.4–570.6)
FIBRINOGEN PPP-MCNC: 370 MG/DL — SIGNIFICANT CHANGE UP (ref 204.4–570.6)
GLUCOSE SERPL-MCNC: 112 MG/DL — HIGH (ref 70–99)
GLUCOSE SERPL-MCNC: 115 MG/DL — HIGH (ref 70–99)
GLUCOSE SERPL-MCNC: 89 MG/DL — SIGNIFICANT CHANGE UP (ref 70–99)
HCT VFR BLD CALC: 33.3 % — LOW (ref 37–47)
HCT VFR BLD CALC: 35.7 % — LOW (ref 37–47)
HCT VFR BLD CALC: 40.3 % — SIGNIFICANT CHANGE UP (ref 37–47)
HGB BLD-MCNC: 11.3 G/DL — LOW (ref 12–16)
HGB BLD-MCNC: 12.2 G/DL — SIGNIFICANT CHANGE UP (ref 12–16)
HGB BLD-MCNC: 13.6 G/DL — SIGNIFICANT CHANGE UP (ref 12–16)
IMM GRANULOCYTES NFR BLD AUTO: 0.4 % — HIGH (ref 0.1–0.3)
IMM GRANULOCYTES NFR BLD AUTO: 0.4 % — HIGH (ref 0.1–0.3)
IMM GRANULOCYTES NFR BLD AUTO: 0.6 % — HIGH (ref 0.1–0.3)
INR BLD: 0.78 RATIO — SIGNIFICANT CHANGE UP (ref 0.65–1.3)
INR BLD: 0.78 RATIO — SIGNIFICANT CHANGE UP (ref 0.65–1.3)
INR BLD: 0.79 RATIO — SIGNIFICANT CHANGE UP (ref 0.65–1.3)
L&D DRUG SCREEN, URINE: SIGNIFICANT CHANGE UP
LDH SERPL L TO P-CCNC: 267 — HIGH (ref 50–242)
LDH SERPL L TO P-CCNC: 306 — HIGH (ref 50–242)
LDH SERPL L TO P-CCNC: 319 — HIGH (ref 50–242)
LYMPHOCYTES # BLD AUTO: 1.59 K/UL — SIGNIFICANT CHANGE UP (ref 1.2–3.4)
LYMPHOCYTES # BLD AUTO: 1.98 K/UL — SIGNIFICANT CHANGE UP (ref 1.2–3.4)
LYMPHOCYTES # BLD AUTO: 14.7 % — LOW (ref 20.5–51.1)
LYMPHOCYTES # BLD AUTO: 2.4 K/UL — SIGNIFICANT CHANGE UP (ref 1.2–3.4)
LYMPHOCYTES # BLD AUTO: 22.4 % — SIGNIFICANT CHANGE UP (ref 20.5–51.1)
LYMPHOCYTES # BLD AUTO: 9.9 % — LOW (ref 20.5–51.1)
MAGNESIUM SERPL-MCNC: 6.2 MG/DL — CRITICAL HIGH (ref 1.8–2.4)
MAGNESIUM SERPL-MCNC: 7 MG/DL — CRITICAL HIGH (ref 1.8–2.4)
MCHC RBC-ENTMCNC: 28.3 PG — SIGNIFICANT CHANGE UP (ref 27–31)
MCHC RBC-ENTMCNC: 28.4 PG — SIGNIFICANT CHANGE UP (ref 27–31)
MCHC RBC-ENTMCNC: 28.6 PG — SIGNIFICANT CHANGE UP (ref 27–31)
MCHC RBC-ENTMCNC: 33.7 G/DL — SIGNIFICANT CHANGE UP (ref 32–37)
MCHC RBC-ENTMCNC: 33.9 G/DL — SIGNIFICANT CHANGE UP (ref 32–37)
MCHC RBC-ENTMCNC: 34.2 G/DL — SIGNIFICANT CHANGE UP (ref 32–37)
MCV RBC AUTO: 83 FL — SIGNIFICANT CHANGE UP (ref 81–99)
MCV RBC AUTO: 84 FL — SIGNIFICANT CHANGE UP (ref 81–99)
MCV RBC AUTO: 84.3 FL — SIGNIFICANT CHANGE UP (ref 81–99)
METHADONE UR-MCNC: NEGATIVE — SIGNIFICANT CHANGE UP
MONOCYTES # BLD AUTO: 0.42 K/UL — SIGNIFICANT CHANGE UP (ref 0.1–0.6)
MONOCYTES # BLD AUTO: 0.56 K/UL — SIGNIFICANT CHANGE UP (ref 0.1–0.6)
MONOCYTES # BLD AUTO: 0.82 K/UL — HIGH (ref 0.1–0.6)
MONOCYTES NFR BLD AUTO: 3.9 % — SIGNIFICANT CHANGE UP (ref 1.7–9.3)
MONOCYTES NFR BLD AUTO: 4.2 % — SIGNIFICANT CHANGE UP (ref 1.7–9.3)
MONOCYTES NFR BLD AUTO: 5.1 % — SIGNIFICANT CHANGE UP (ref 1.7–9.3)
NEUTROPHILS # BLD AUTO: 10.88 K/UL — HIGH (ref 1.4–6.5)
NEUTROPHILS # BLD AUTO: 13.58 K/UL — HIGH (ref 1.4–6.5)
NEUTROPHILS # BLD AUTO: 7.78 K/UL — HIGH (ref 1.4–6.5)
NEUTROPHILS NFR BLD AUTO: 72.6 % — SIGNIFICANT CHANGE UP (ref 42.2–75.2)
NEUTROPHILS NFR BLD AUTO: 80.5 % — HIGH (ref 42.2–75.2)
NEUTROPHILS NFR BLD AUTO: 84.2 % — HIGH (ref 42.2–75.2)
NRBC # BLD: 0 /100 WBCS — SIGNIFICANT CHANGE UP (ref 0–0)
OPIATES UR-MCNC: NEGATIVE — SIGNIFICANT CHANGE UP
OXYCODONE UR-MCNC: NEGATIVE — SIGNIFICANT CHANGE UP
PCP UR-MCNC: NEGATIVE — SIGNIFICANT CHANGE UP
PLATELET # BLD AUTO: 147 K/UL — SIGNIFICANT CHANGE UP (ref 130–400)
PLATELET # BLD AUTO: 150 K/UL — SIGNIFICANT CHANGE UP (ref 130–400)
PLATELET # BLD AUTO: 153 K/UL — SIGNIFICANT CHANGE UP (ref 130–400)
PMV BLD: 11.4 FL — HIGH (ref 7.4–10.4)
PMV BLD: 11.4 FL — HIGH (ref 7.4–10.4)
PMV BLD: 11.8 FL — HIGH (ref 7.4–10.4)
POTASSIUM SERPL-MCNC: 4 MMOL/L — SIGNIFICANT CHANGE UP (ref 3.5–5)
POTASSIUM SERPL-MCNC: 4.1 MMOL/L — SIGNIFICANT CHANGE UP (ref 3.5–5)
POTASSIUM SERPL-MCNC: 4.3 MMOL/L — SIGNIFICANT CHANGE UP (ref 3.5–5)
POTASSIUM SERPL-SCNC: 4 MMOL/L — SIGNIFICANT CHANGE UP (ref 3.5–5)
POTASSIUM SERPL-SCNC: 4.1 MMOL/L — SIGNIFICANT CHANGE UP (ref 3.5–5)
POTASSIUM SERPL-SCNC: 4.3 MMOL/L — SIGNIFICANT CHANGE UP (ref 3.5–5)
PROPOXYPHENE QUALITATIVE URINE RESULT: NEGATIVE — SIGNIFICANT CHANGE UP
PROT ?TM UR-MCNC: <5 MG/DLG/24H — SIGNIFICANT CHANGE UP
PROT SERPL-MCNC: 5.4 G/DL — LOW (ref 6–8)
PROT SERPL-MCNC: 5.6 G/DL — LOW (ref 6–8)
PROT SERPL-MCNC: 5.8 G/DL — LOW (ref 6–8)
PROT/CREAT UR-RTO: <0.2 RATIO — SIGNIFICANT CHANGE UP (ref 0–0.2)
PROTHROM AB SERPL-ACNC: 8.8 SEC — LOW (ref 9.95–12.87)
PROTHROM AB SERPL-ACNC: 8.8 SEC — LOW (ref 9.95–12.87)
PROTHROM AB SERPL-ACNC: 8.9 SEC — LOW (ref 9.95–12.87)
RBC # BLD: 3.95 M/UL — LOW (ref 4.2–5.4)
RBC # BLD: 4.3 M/UL — SIGNIFICANT CHANGE UP (ref 4.2–5.4)
RBC # BLD: 4.8 M/UL — SIGNIFICANT CHANGE UP (ref 4.2–5.4)
RBC # FLD: 13.7 % — SIGNIFICANT CHANGE UP (ref 11.5–14.5)
RBC # FLD: 13.9 % — SIGNIFICANT CHANGE UP (ref 11.5–14.5)
RBC # FLD: 13.9 % — SIGNIFICANT CHANGE UP (ref 11.5–14.5)
SODIUM SERPL-SCNC: 130 MMOL/L — LOW (ref 135–146)
SODIUM SERPL-SCNC: 133 MMOL/L — LOW (ref 135–146)
SODIUM SERPL-SCNC: 133 MMOL/L — LOW (ref 135–146)
URATE SERPL-MCNC: 6.7 MG/DL — SIGNIFICANT CHANGE UP (ref 2.5–7)
URATE SERPL-MCNC: 6.8 MG/DL — SIGNIFICANT CHANGE UP (ref 2.5–7)
URATE SERPL-MCNC: 6.9 MG/DL — SIGNIFICANT CHANGE UP (ref 2.5–7)
WBC # BLD: 10.71 K/UL — SIGNIFICANT CHANGE UP (ref 4.8–10.8)
WBC # BLD: 13.49 K/UL — HIGH (ref 4.8–10.8)
WBC # BLD: 16.11 K/UL — HIGH (ref 4.8–10.8)
WBC # FLD AUTO: 10.71 K/UL — SIGNIFICANT CHANGE UP (ref 4.8–10.8)
WBC # FLD AUTO: 13.49 K/UL — HIGH (ref 4.8–10.8)
WBC # FLD AUTO: 16.11 K/UL — HIGH (ref 4.8–10.8)

## 2023-09-02 RX ORDER — SODIUM CHLORIDE 9 MG/ML
1000 INJECTION, SOLUTION INTRAVENOUS
Refills: 0 | Status: DISCONTINUED | OUTPATIENT
Start: 2023-09-02 | End: 2023-09-04

## 2023-09-02 RX ORDER — IBUPROFEN 200 MG
600 TABLET ORAL EVERY 6 HOURS
Refills: 0 | Status: DISCONTINUED | OUTPATIENT
Start: 2023-09-02 | End: 2023-09-04

## 2023-09-02 RX ORDER — SENNA PLUS 8.6 MG/1
2 TABLET ORAL DAILY
Refills: 0 | Status: DISCONTINUED | OUTPATIENT
Start: 2023-09-02 | End: 2023-09-04

## 2023-09-02 RX ORDER — LOPERAMIDE HCL 2 MG
2 TABLET ORAL ONCE
Refills: 0 | Status: COMPLETED | OUTPATIENT
Start: 2023-09-02 | End: 2023-09-02

## 2023-09-02 RX ORDER — MAGNESIUM SULFATE 500 MG/ML
1 VIAL (ML) INJECTION
Qty: 40 | Refills: 0 | Status: DISCONTINUED | OUTPATIENT
Start: 2023-09-02 | End: 2023-09-03

## 2023-09-02 RX ADMIN — Medication 50 GM/HR: at 00:15

## 2023-09-02 RX ADMIN — Medication 975 MILLIGRAM(S): at 06:02

## 2023-09-02 RX ADMIN — Medication 975 MILLIGRAM(S): at 18:27

## 2023-09-02 RX ADMIN — Medication 1000 MILLIGRAM(S): at 00:38

## 2023-09-02 RX ADMIN — Medication 2 MILLIGRAM(S): at 00:34

## 2023-09-02 RX ADMIN — SENNA PLUS 2 TABLET(S): 8.6 TABLET ORAL at 12:36

## 2023-09-02 RX ADMIN — Medication 975 MILLIGRAM(S): at 12:11

## 2023-09-02 RX ADMIN — SODIUM CHLORIDE 75 MILLILITER(S): 9 INJECTION, SOLUTION INTRAVENOUS at 15:21

## 2023-09-02 RX ADMIN — Medication 30 MILLIGRAM(S): at 00:24

## 2023-09-02 RX ADMIN — Medication 600 MILLIGRAM(S): at 15:18

## 2023-09-02 RX ADMIN — Medication 600 MILLIGRAM(S): at 09:25

## 2023-09-02 RX ADMIN — Medication 975 MILLIGRAM(S): at 18:02

## 2023-09-02 RX ADMIN — Medication 1 TABLET(S): at 12:35

## 2023-09-02 RX ADMIN — Medication 975 MILLIGRAM(S): at 12:30

## 2023-09-02 RX ADMIN — Medication 975 MILLIGRAM(S): at 06:53

## 2023-09-02 RX ADMIN — Medication 600 MILLIGRAM(S): at 21:00

## 2023-09-02 NOTE — PROGRESS NOTE ADULT - ASSESSMENT
A/P: 32y now G2 s/p VAVD with preeclampsia with severe features on magnesium for seizure prophylaxis, PPD1.   - continue magnesium until 9/2 @2353; Magnesium rate dropped to 1g/hr  - next PELs and magnesium level at 2000  - f/u BPs; Current BPs 109/66  - stricts I/Os, Adequate UOP  - continuous EFM and toco  - Seizure precautions  - Reevaluate in 2hours     Dr. Valenzuela to be aware

## 2023-09-02 NOTE — PROGRESS NOTE ADULT - SUBJECTIVE AND OBJECTIVE BOX
PGY1 Magnesium Note     Subjective:   Pt evaluated at bedside for magnesium check. She denies headache, vision changes, dizziness, SOB, chest pain, RUQ/epigastric pain.    Objective:   Vital signs: T(F): 98.2 (09-02-23 @ 14:55), Max: 98.2 (09-02-23 @ 12:55)  HR: 69 (09-02-23 @ 22:13) (58 - 90)  BP: 124/72 (09-02-23 @ 22:10) (100/58 - 148/66)  RR: 16 (09-02-23 @ 14:55) (16 - 16)  SpO2: 98% (09-02-23 @ 22:13) (88% - 100%)    09-01-23 @ 07:01  -  09-02-23 @ 07:00  --------------------------------------------------------  IN: 875 mL / OUT: 2464 mL / NET: -1589 mL    09-02-23 @ 07:01  -  09-02-23 @ 22:18  --------------------------------------------------------  IN: 2410 mL / OUT: 2520 mL / NET: -110 mL    UO: (7128-5148) 225cc    Gen: NAD, AAOx3  CV: S1S2, RRR, no M/R/G  Pulm: CTAB, no rhonchi or rales or wheezing  Ext: no calf tenderness or edema SCDs in place  Neuro: 2+ DTR bilaterally    Medications:  acetaminophen     Tablet ..: 975 (09-02-23 @ 18:02),  975 (09-02-23 @ 12:11),  975 (09-02-23 @ 06:02)  acetaminophen   IVPB ..: 400 (09-01-23 @ 23:33)  carboprost Injectable: 250 (09-01-23 @ 23:07)  hydrALAZINE Injectable: 5 (09-01-23 @ 23:43)  ibuprofen  Tablet.: 600 (09-02-23 @ 21:00),  600 (09-02-23 @ 15:18),  600 (09-02-23 @ 09:25)  ketorolac   Injectable: 30 (09-01-23 @ 23:31)  lactated ringers.: 75 (09-02-23 @ 00:43)  loperamide: 2 (09-02-23 @ 00:34)  magnesium sulfate  IVPB: 300 (09-01-23 @ 23:53)  magnesium sulfate Infusion: 50 (09-01-23 @ 23:34)  misoprostol: 800 (09-01-23 @ 23:20)  prenatal multivitamin: 1 (09-02-23 @ 12:35)  senna: 2 (09-02-23 @ 12:36)      Labs:                         11.3   10.71 )-----------( 150      ( 02 Sep 2023 20:10 )             33.3   09-02    133<L>  |  104  |  8<L>  ----------------------------<  89  4.0   |  21  |  0.8    Ca    7.3<L>      02 Sep 2023 20:10  Mg     6.2     09-02    TPro  5.4<L>  /  Alb  2.9<L>  /  TBili  <0.2  /  DBili  x   /  AST  27  /  ALT  12  /  AlkPhos  219<H>  09-02      Urinalysis Basic - ( 02 Sep 2023 20:10 )    Color: x / Appearance: x / SG: x / pH: x  Gluc: 89 mg/dL / Ketone: x  / Bili: x / Urobili: x   Blood: x / Protein: x / Nitrite: x   Leuk Esterase: x / RBC: x / WBC x   Sq Epi: x / Non Sq Epi: x / Bacteria: x

## 2023-09-02 NOTE — PROGRESS NOTE ADULT - ASSESSMENT
A/P: 32y now P2 s/p VAVD with preeclampsia with severe features on magnesium for seizure prophylaxis, PPD#1.     - continue magnesium until 9/2 @2353; Magnesium rate dropped to 1g/hr  - f/u BPs; Current BPs 124/72  - stricts I/Os, Adequate UOP  - Seizure precautions  - Reevaluate in 2hours     Dr. Son and Dr. Valenzuela to be aware

## 2023-09-02 NOTE — PROGRESS NOTE ADULT - ASSESSMENT
A/P: 32y now G2 s/p VAVD with preeclampsia with severe features on magnesium for seizure prophylaxis, PPD1.   - continue magnesium until 9/2 @2353; Magnesium rate dropped to 1g/hr  - next PELs and magnesium level at 1200, f/u uprcr  - f/u BPs; Current BPs 100/78   - stricts I/Os, Adequate UOP  - continuous EFM and toco  - Seizure precautions  - Reevaluate in 2hours     Dr. Son and Dr. Valenzuela aware

## 2023-09-02 NOTE — PROGRESS NOTE ADULT - ASSESSMENT
A/P: 32y now G2 s/p VAVD with preeclampsia with severe features on magnesium for seizure prophylaxis  - continue magnesium until 9/2 @2353  - next PELs and magnesium level at 1200, f/u uprcr  - f/u BPs; Current BPs 126/86  - stricts I/Os, Adequate UOP  - continuous EFM and toco  - Seizure precautions  - Reevaluate in 2hours     Dr. Hanson to be aware

## 2023-09-02 NOTE — PROGRESS NOTE ADULT - SUBJECTIVE AND OBJECTIVE BOX
PGY 3 Magnesium Note     Subjective:   Pt evaluated at bedside for magnesium check.  denies headache or changes in vision.  She denies flushing, dizziness, SOB, chest pain, RUQ/epigastric pain.     Objective:   T(C): 36.8 (02 Sep 2023 14:55), Max: 36.9 (01 Sep 2023 22:06)  T(F): 98.2 (02 Sep 2023 14:55), Max: 98.4 (01 Sep 2023 22:06)  HR: 70 (02 Sep 2023 16:18) (52 - 94)  BP: 109/66 (02 Sep 2023 16:10) (100/58 - 177/98)  RR: 16 (02 Sep 2023 14:55) (16 - 18)  SpO2: 98% (02 Sep 2023 16:18) (79% - 100%)    UO (min 27cc/hr): (7846-8864) 990cc    Gen: NAD, AAOx3  CV: S1S2, RRR, no M/R/G  Pulm: CTAB  Ext: no calf tenderness or edema SCDs in place  Neuro: 2+ DTR bilaterally  Abd: soft, nontender, no rebound or guarding, no epigastric tenderness    Medications:  acetaminophen   IVPB ..: 400 (09-01-23 @ 23:33)  carboprost Injectable: 250 (09-01-23 @ 23:07)  hydrALAZINE Injectable: 5 (09-01-23 @ 23:43)  ketorolac   Injectable: 30 (09-01-23 @ 23:31)  loperamide: 2 (09-02-23 @ 00:34)  magnesium sulfate  IVPB: 300 (09-01-23 @ 23:53)  magnesium sulfate Infusion: 50 (09-01-23 @ 23:34)  misoprostol: 800 (09-01-23 @ 23:20)    Labs  9/1 136/4.1/102/23/14/0.8<85, AST/ALT 27/16, uric acid 7.6, , PT/INR/PTT 9.2/0.81/29.7, fibrinogen 410, UA neg, Uprcr <0.2  9/2 @0600 16.11>13.6/40.3<153, PT/PTT/INR 8.8/29.2/0.78, fibrinogen 370, 133/4.3/104/10/0.7<112, AST/ALT 30/14, , uric acid 6.9, Mg 7.0  @1215 13.49>12.2/35.7<147, coags 89/0.79/30, fibrinogen 360       What Is The Reason For Today's Visit?: History of Melanoma Year Excised?: 2019 [place] Depth?: .2

## 2023-09-02 NOTE — PROGRESS NOTE ADULT - SUBJECTIVE AND OBJECTIVE BOX
PGY 2 Magnesium Note     Subjective:   Pt evaluated at bedside for magnesium check. Reports pain behind her eyes, denies headache or changes in vision.  She denies flushing, dizziness, SOB, chest pain, RUQ/epigastric pain.     Objective:   Vital Signs Last 24 Hrs  T(C): 36.8 (02 Sep 2023 07:40), Max: 36.9 (01 Sep 2023 22:06)  T(F): 98.2 (02 Sep 2023 07:40), Max: 98.4 (01 Sep 2023 22:06)  HR: 80 (02 Sep 2023 09:28) (52 - 94)  BP: 100/78 (02 Sep 2023 09:25) (100/78 - 177/98)  RR: 16 (02 Sep 2023 08:10) (16 - 18)  SpO2: 97% (02 Sep 2023 09:28) (79% - 100%)    UO (min 27cc/hr): (8917-2397) 825cc (2522-0122) 375    Gen: NAD, AAOx3  CV: S1S2, RRR, no M/R/G  Pulm: CTAB  Ext: no calf tenderness or edema SCDs in place  Neuro: 2+ DTR bilaterally  Abd: soft, nontender, no rebound or guarding, no epigastric tenderness    Medications:  acetaminophen   IVPB ..: 400 (09-01-23 @ 23:33)  carboprost Injectable: 250 (09-01-23 @ 23:07)  hydrALAZINE Injectable: 5 (09-01-23 @ 23:43)  ketorolac   Injectable: 30 (09-01-23 @ 23:31)  loperamide: 2 (09-02-23 @ 00:34)  magnesium sulfate  IVPB: 300 (09-01-23 @ 23:53)  magnesium sulfate Infusion: 50 (09-01-23 @ 23:34)  misoprostol: 800 (09-01-23 @ 23:20)      Labs:   9/1 136/4.1/102/23/14/0.8<85, AST/ALT 27/16, uric acid 7.6, , PT/INR/PTT 9.2/0.81/29.7, fibrinogen 410, UA neg  9/2 @0600 16.11>13.6/40.3<153, PT/PTT/INR 8.8/29.2/0.78, fibrinogen 370, 133/4.3/104/10/0.7<112, AST/ALT 30/14, , uric acid 6.9, Mg 7.0

## 2023-09-02 NOTE — PROGRESS NOTE ADULT - SUBJECTIVE AND OBJECTIVE BOX
PGY 3 Magnesium Note     Subjective:   Pt evaluated at bedside for magnesium check. She denies flushing, headache, vision changes, dizziness, SOB, chest pain, RUQ/epigastric pain.     Objective:   Vital Signs Last 24 Hrs  T(C): 36.8 (02 Sep 2023 06:13), Max: 36.9 (01 Sep 2023 22:06)  T(F): 98.3 (02 Sep 2023 06:13), Max: 98.4 (01 Sep 2023 22:06)  HR: 72 (02 Sep 2023 07:23) (52 - 94)  BP: 123/86 (02 Sep 2023 07:10) (101/64 - 177/98)  RR: 16 (02 Sep 2023 06:54) (16 - 18)  SpO2: 99% (02 Sep 2023 07:23) (86% - 100%)    UO (min 27cc/hr): (4405-7553) 825cc    Gen: NAD, AAOx3  CV: S1S2, RRR, no M/R/G  Pulm: CTAB  Ext: no calf tenderness or edema SCDs in place  Neuro: 2+ DTR bilaterally  Abd: soft, nontender, no rebound or guarding, no epigastric tenderness    Medications:  acetaminophen   IVPB ..: 400 (09-01-23 @ 23:33)  carboprost Injectable: 250 (09-01-23 @ 23:07)  hydrALAZINE Injectable: 5 (09-01-23 @ 23:43)  ketorolac   Injectable: 30 (09-01-23 @ 23:31)  loperamide: 2 (09-02-23 @ 00:34)  magnesium sulfate  IVPB: 300 (09-01-23 @ 23:53)  magnesium sulfate Infusion: 50 (09-01-23 @ 23:34)  misoprostol: 800 (09-01-23 @ 23:20)      Labs:   9/1 136/4.1/102/23/14/0.8<85, AST/ALT 27/16, uric acid 7.6, , PT/INR/PTT 9.2/0.81/29.7, fibrinogen 410, UA neg

## 2023-09-02 NOTE — PROGRESS NOTE ADULT - ASSESSMENT
A/P: 32y now G2 s/p VAVD with preeclampsia with severe features on magnesium for seizure prophylaxis, PPD1.   - continue magnesium until 9/2 @2353; Magnesium rate dropped to 1g/hr  - next PELs and magnesium level at 1200  - f/u BPs; Current BPs 100/78   - stricts I/Os, Adequate UOP  - continuous EFM and toco  - Seizure precautions  - Reevaluate in 2hours     Dr. Son and Dr. Valenzuela aware

## 2023-09-02 NOTE — PROGRESS NOTE ADULT - ASSESSMENT
A/P: 32y now G2 s/p VAVD with preeclampsia with severe features on magnesium for seizure prophylaxis  - continue magnesium until 9/2 @2353  - next PELs and magnesium level at 0600, f/u uprcr  - f/u BPs; Current BPs 122/76  - stricts I/Os, Adequate UOP  - continuous EFM and toco  - Seizure precautions  - Reevaluate in 2hours     Dr. Lyons and and Dr. Hanson to be made aware

## 2023-09-02 NOTE — PROGRESS NOTE ADULT - SUBJECTIVE AND OBJECTIVE BOX
PGY 1 Magnesium Note     Subjective:   Pt evaluated at bedside for magnesium check. She denies flushing, headache, vision changes, dizziness, SOB, chest pain, RUQ/epigastric pain.     Objective:   Vital signs: T(F): 98.4 (09-01-23 @ 23:32), Max: 98.4 (09-01-23 @ 22:06)  HR: 76 (09-02-23 @ 02:10) (60 - 94)  BP: 130/86 (09-02-23 @ 02:10) (130/86 - 177/98)  RR: 18 (09-02-23 @ 01:17) (17 - 18)  SpO2: 98% (09-02-23 @ 02:08) (97% - 100%)    09-01-23 @ 07:01  -  09-02-23 @ 02:12  --------------------------------------------------------  IN: 250 mL / OUT: 1014 mL / NET: -764 mL        Gen: NAD, AAOx3  CV: S1S2, RRR, no M/R/G  Pulm: CTAB  Ext: no calf tenderness or edema SCDs in place  Neuro: 2+ DTR bilaterally  Abd: soft, nontender, no rebound or guarding, no epigastric tenderness    Medications:  (ADM OVERRIDE): 5 (09-01-23 @ 23:17)  (ADM OVERRIDE): 1 (09-01-23 @ 23:38)  (ADM OVERRIDE): 1 (09-01-23 @ 23:36)  (ADM OVERRIDE): 1 (09-01-23 @ 22:56)  (ADM OVERRIDE): 1 (09-01-23 @ 23:36)  (ADM OVERRIDE): 1 (09-01-23 @ 23:10)  acetaminophen   IVPB ..: 400 (09-01-23 @ 23:33)  carboprost Injectable: 250 (09-01-23 @ 23:07)  hydrALAZINE Injectable: 5 (09-01-23 @ 23:43)  ketorolac   Injectable: 30 (09-01-23 @ 23:31)  loperamide: 2 (09-02-23 @ 00:34)  magnesium sulfate  IVPB: 300 (09-01-23 @ 23:53)  magnesium sulfate Infusion: 50 (09-01-23 @ 23:34)  misoprostol: 800 (09-01-23 @ 23:20)      Labs:                         14.3   7.80  )-----------( 164      ( 01 Sep 2023 22:31 )             43.0   09-01    136  |  102  |  14  ----------------------------<  85  4.1   |  23  |  0.8    Ca    9.6      01 Sep 2023 22:31    TPro  7.0  /  Alb  3.6  /  TBili  0.3  /  DBili  x   /  AST  27  /  ALT  16  /  AlkPhos  328<H>  09-01      Urinalysis Basic - ( 01 Sep 2023 22:31 )    Color: Yellow / Appearance: Cloudy / SG: <1.005 / pH: x  Gluc: 85 mg/dL / Ketone: Negative mg/dL  / Bili: Negative / Urobili: 0.2 mg/dL   Blood: x / Protein: Negative mg/dL / Nitrite: Negative   Leuk Esterase: Large / RBC: 0 /HPF / WBC 38 /HPF   Sq Epi: x / Non Sq Epi: 4 /HPF / Bacteria: Occasional /HPF

## 2023-09-02 NOTE — PROGRESS NOTE ADULT - SUBJECTIVE AND OBJECTIVE BOX
PGY1 Magnesium Note     Subjective:   Pt evaluated at bedside for magnesium check. She denies headache, vision changes, dizziness, SOB, chest pain, RUQ/epigastric pain.    Objective:   Vital signs: T(F): 98.2 (09-02-23 @ 14:55), Max: 98.2 (09-02-23 @ 07:40)  HR: 75 (09-02-23 @ 18:25) (58 - 93)  BP: 124/80 (09-02-23 @ 18:25) (100/58 - 136/91)  RR: 16 (09-02-23 @ 14:55) (16 - 16)  SpO2: 98% (09-02-23 @ 18:23) (79% - 100%)    09-01-23 @ 07:01  -  09-02-23 @ 07:00  --------------------------------------------------------  IN: 875 mL / OUT: 2464 mL / NET: -1589 mL    09-02-23 @ 07:01  -  09-02-23 @ 18:29  --------------------------------------------------------  IN: 1845 mL / OUT: 1870 mL / NET: -25 mL    Gen: NAD, AAOx3  CV: S1S2, RRR, no M/R/G  Pulm: CTAB, no rhonchi or rales or wheezing  Ext: no calf tenderness or edema SCDs in place  Neuro: 2+ DTR bilaterally    Medications:  acetaminophen     Tablet ..: 975 (09-02-23 @ 18:02),  975 (09-02-23 @ 12:11),  975 (09-02-23 @ 06:02)  acetaminophen   IVPB ..: 400 (09-01-23 @ 23:33)  carboprost Injectable: 250 (09-01-23 @ 23:07)  hydrALAZINE Injectable: 5 (09-01-23 @ 23:43)  ibuprofen  Tablet.: 600 (09-02-23 @ 15:18),  600 (09-02-23 @ 09:25)  ketorolac   Injectable: 30 (09-01-23 @ 23:31)  lactated ringers.: 75 (09-02-23 @ 00:43)  loperamide: 2 (09-02-23 @ 00:34)  magnesium sulfate  IVPB: 300 (09-01-23 @ 23:53)  magnesium sulfate Infusion: 50 (09-01-23 @ 23:34)  misoprostol: 800 (09-01-23 @ 23:20)  prenatal multivitamin: 1 (09-02-23 @ 12:35)  senna: 2 (09-02-23 @ 12:36)      Labs:                         12.2   13.49 )-----------( 147      ( 02 Sep 2023 12:17 )             35.7   09-02    130<L>  |  101  |  9<L>  ----------------------------<  115<H>  4.1   |  20  |  0.8    Ca    7.4<L>      02 Sep 2023 12:17  Mg     6.2     09-02    TPro  5.6<L>  /  Alb  2.8<L>  /  TBili  0.2  /  DBili  x   /  AST  30  /  ALT  13  /  AlkPhos  246<H>  09-02      Urinalysis Basic - ( 02 Sep 2023 12:17 )    Color: x / Appearance: x / SG: x / pH: x  Gluc: 115 mg/dL / Ketone: x  / Bili: x / Urobili: x   Blood: x / Protein: x / Nitrite: x   Leuk Esterase: x / RBC: x / WBC x   Sq Epi: x / Non Sq Epi: x / Bacteria: x

## 2023-09-02 NOTE — PROGRESS NOTE ADULT - ASSESSMENT
A/P: 32y now P2 s/p VAVD with preeclampsia with severe features on magnesium for seizure prophylaxis, PPD#1.     - continue magnesium until 9/2 @2353; Magnesium rate dropped to 1g/hr  - next PELs and magnesium level at 2000  - f/u BPs; Current BPs 124/80  - stricts I/Os, Adequate UOP  - continuous EFM and toco  - Seizure precautions  - Reevaluate in 2hours     Dr. Son and Dr. Valenzuela to be aware

## 2023-09-02 NOTE — PROGRESS NOTE ADULT - SUBJECTIVE AND OBJECTIVE BOX
PGY 2 Magnesium Note     Subjective:   Pt evaluated at bedside for magnesium check.  denies headache or changes in vision.  She denies flushing, dizziness, SOB, chest pain, RUQ/epigastric pain.     Objective:   Vital Signs Last 24 Hrs  T(C): 36.8 (02 Sep 2023 12:55), Max: 36.9 (01 Sep 2023 22:06)  T(F): 98.2 (02 Sep 2023 12:55), Max: 98.4 (01 Sep 2023 22:06)  HR: 69 (02 Sep 2023 13:03) (52 - 94)  BP: 117/76 (02 Sep 2023 12:55) (100/78 - 177/98)  RR: 16 (02 Sep 2023 12:55) (16 - 18)  SpO2: 100% (02 Sep 2023 13:03) (79% - 100%)      UO (min 27cc/hr): (8335-8040) 825cc (2805-1861) 375    Gen: NAD, AAOx3  CV: S1S2, RRR, no M/R/G  Pulm: CTAB  Ext: no calf tenderness or edema SCDs in place  Neuro: 2+ DTR bilaterally  Abd: soft, nontender, no rebound or guarding, no epigastric tenderness    Medications:  acetaminophen   IVPB ..: 400 (09-01-23 @ 23:33)  carboprost Injectable: 250 (09-01-23 @ 23:07)  hydrALAZINE Injectable: 5 (09-01-23 @ 23:43)  ketorolac   Injectable: 30 (09-01-23 @ 23:31)  loperamide: 2 (09-02-23 @ 00:34)  magnesium sulfate  IVPB: 300 (09-01-23 @ 23:53)  magnesium sulfate Infusion: 50 (09-01-23 @ 23:34)  misoprostol: 800 (09-01-23 @ 23:20)    Labs  9/1 136/4.1/102/23/14/0.8<85, AST/ALT 27/16, uric acid 7.6, , PT/INR/PTT 9.2/0.81/29.7, fibrinogen 410, UA neg, Uprcr <0.2  9/2 @0600 16.11>13.6/40.3<153, PT/PTT/INR 8.8/29.2/0.78, fibrinogen 370, 133/4.3/104/10/0.7<112, AST/ALT 30/14, , uric acid 6.9, Mg 7.0  @1215 13.49>12.2/35.7<147, coags 89/0.79/30, fibrinogen 360

## 2023-09-02 NOTE — PROGRESS NOTE ADULT - ASSESSMENT
A/P: 32y now G2 s/p VAVD with preeclampsia with severe features on magnesium for seizure prophylaxis  - continue magnesium until 9/2 @2353  - next PELs and magnesium level at 0600, f/u uprcr  - f/u BPs; Current BPs 130/86   - stricts I/Os, Adequate UOP  - continuous EFM and toco  - Seizure precautions  - Reevaluate in 2hours     Dr. Lyons and and Dr. Hanson to be made aware

## 2023-09-02 NOTE — PROGRESS NOTE ADULT - SUBJECTIVE AND OBJECTIVE BOX
PGY 1 Magnesium Note     Subjective:   Pt evaluated at bedside for magnesium check. She denies flushing, headache, vision changes, dizziness, SOB, chest pain, RUQ/epigastric pain.     Objective:   T(C): 36.9 (09-01-23 @ 23:32), Max: 36.9 (09-01-23 @ 22:06)  T(F): 98.4 (09-01-23 @ 23:32), Max: 98.4 (09-01-23 @ 22:06)  HR: 70 (09-02-23 @ 04:40) (52 - 94)  BP: 122/76 (09-02-23 @ 04:40) (101/64 - 177/98)  RR: 17 (09-02-23 @ 03:38) (17 - 18)  SpO2: 94% (09-02-23 @ 04:40) (94% - 100%)    I&O's Summary    01 Sep 2023 07:01  -  02 Sep 2023 04:42  --------------------------------------------------------  IN: 500 mL / OUT: 1639 mL / NET: -1139 mL      Gen: NAD, AAOx3  CV: S1S2, RRR, no M/R/G  Pulm: CTAB  Ext: no calf tenderness or edema SCDs in place  Neuro: 2+ DTR bilaterally  Abd: soft, nontender, no rebound or guarding, no epigastric tenderness    Medications:  (ADM OVERRIDE): 5 (09-01-23 @ 23:17)  (ADM OVERRIDE): 1 (09-01-23 @ 23:38)  (ADM OVERRIDE): 1 (09-01-23 @ 23:36)  (ADM OVERRIDE): 1 (09-01-23 @ 22:56)  (ADM OVERRIDE): 1 (09-01-23 @ 23:36)  (ADM OVERRIDE): 1 (09-01-23 @ 23:10)  acetaminophen   IVPB ..: 400 (09-01-23 @ 23:33)  carboprost Injectable: 250 (09-01-23 @ 23:07)  hydrALAZINE Injectable: 5 (09-01-23 @ 23:43)  ketorolac   Injectable: 30 (09-01-23 @ 23:31)  loperamide: 2 (09-02-23 @ 00:34)  magnesium sulfate  IVPB: 300 (09-01-23 @ 23:53)  magnesium sulfate Infusion: 50 (09-01-23 @ 23:34)  misoprostol: 800 (09-01-23 @ 23:20)      Labs:                         14.3   7.80  )-----------( 164      ( 01 Sep 2023 22:31 )             43.0   09-01    136  |  102  |  14  ----------------------------<  85  4.1   |  23  |  0.8    Ca    9.6      01 Sep 2023 22:31    TPro  7.0  /  Alb  3.6  /  TBili  0.3  /  DBili  x   /  AST  27  /  ALT  16  /  AlkPhos  328<H>  09-01      Urinalysis Basic - ( 01 Sep 2023 22:31 )    Color: Yellow / Appearance: Cloudy / SG: <1.005 / pH: x  Gluc: 85 mg/dL / Ketone: Negative mg/dL  / Bili: Negative / Urobili: 0.2 mg/dL   Blood: x / Protein: Negative mg/dL / Nitrite: Negative   Leuk Esterase: Large / RBC: 0 /HPF / WBC 38 /HPF   Sq Epi: x / Non Sq Epi: 4 /HPF / Bacteria: Occasional /HPF

## 2023-09-03 PROCEDURE — 99232 SBSQ HOSP IP/OBS MODERATE 35: CPT

## 2023-09-03 RX ADMIN — Medication 1 SPRAY(S): at 02:44

## 2023-09-03 RX ADMIN — Medication 1 TABLET(S): at 11:22

## 2023-09-03 RX ADMIN — Medication 975 MILLIGRAM(S): at 12:20

## 2023-09-03 RX ADMIN — Medication 600 MILLIGRAM(S): at 21:02

## 2023-09-03 RX ADMIN — Medication 975 MILLIGRAM(S): at 11:23

## 2023-09-03 RX ADMIN — SODIUM CHLORIDE 3 MILLILITER(S): 9 INJECTION INTRAMUSCULAR; INTRAVENOUS; SUBCUTANEOUS at 21:13

## 2023-09-03 RX ADMIN — SODIUM CHLORIDE 3 MILLILITER(S): 9 INJECTION INTRAMUSCULAR; INTRAVENOUS; SUBCUTANEOUS at 13:07

## 2023-09-03 RX ADMIN — SENNA PLUS 2 TABLET(S): 8.6 TABLET ORAL at 11:24

## 2023-09-03 RX ADMIN — Medication 975 MILLIGRAM(S): at 18:00

## 2023-09-03 RX ADMIN — Medication 975 MILLIGRAM(S): at 17:03

## 2023-09-03 RX ADMIN — SODIUM CHLORIDE 3 MILLILITER(S): 9 INJECTION INTRAMUSCULAR; INTRAVENOUS; SUBCUTANEOUS at 06:17

## 2023-09-03 RX ADMIN — Medication 600 MILLIGRAM(S): at 21:32

## 2023-09-03 RX ADMIN — Medication 975 MILLIGRAM(S): at 06:54

## 2023-09-03 RX ADMIN — Medication 1 APPLICATION(S): at 02:44

## 2023-09-03 NOTE — PROGRESS NOTE ADULT - SUBJECTIVE AND OBJECTIVE BOX
MORGAN KAUR  32y  Female    PGY1 Note:   Pt is a 33yo PPD#2. Pt is recovering well, no acute complaints. Endorses a dull temporal HA, 5/10 in severity, began when she woke up around 0630. Declined AM dose of Tylenol. Pt denies fever, chills, nausea, vomiting, SOB, chest pain, extremity swelling or pain. Denies HA, vision changes, RUQ pain or palpitations. Baby in NICU.     MEDICATIONS  (STANDING):  acetaminophen     Tablet .. 975 milliGRAM(s) Oral <User Schedule>  diphtheria/tetanus/pertussis (acellular) Vaccine (Adacel) 0.5 milliLiter(s) IntraMuscular once  ibuprofen  Tablet. 600 milliGRAM(s) Oral every 6 hours  lactated ringers. 1000 milliLiter(s) (75 mL/Hr) IV Continuous <Continuous>  measles/mumps/rubella Vaccine 0.5 milliLiter(s) SubCutaneous once  oxytocin Infusion 41.667 milliUNIT(s)/Min (125 mL/Hr) IV Continuous <Continuous>  prenatal multivitamin 1 Tablet(s) Oral daily  senna 2 Tablet(s) Oral daily  sodium chloride 0.9% lock flush 3 milliLiter(s) IV Push every 8 hours    MEDICATIONS  (PRN):  benzocaine 20%/menthol 0.5% Spray 1 Spray(s) Topical every 6 hours PRN for Perineal discomfort  dibucaine 1% Ointment 1 Application(s) Topical every 6 hours PRN Perineal discomfort  diphenhydrAMINE 25 milliGRAM(s) Oral every 6 hours PRN Pruritus  hydrocortisone 1% Cream 1 Application(s) Topical every 6 hours PRN Moderate Pain (4-6)  lanolin Ointment 1 Application(s) Topical every 6 hours PRN nipple soreness  magnesium hydroxide Suspension 30 milliLiter(s) Oral two times a day PRN Constipation  oxyCODONE    IR 5 milliGRAM(s) Oral every 3 hours PRN Moderate to Severe Pain (4-10)  oxyCODONE    IR 5 milliGRAM(s) Oral once PRN Moderate to Severe Pain (4-10)  simethicone 80 milliGRAM(s) Chew every 4 hours PRN Gas  witch hazel Pads 1 Application(s) Topical every 4 hours PRN Perineal discomfort      PAST MEDICAL & SURGICAL HISTORY:  No pertinent past medical history  No significant past surgical history    Physical Exam  Vital Signs Last 24 Hrs  T(C): 36.8 (03 Sep 2023 06:52), Max: 37 (03 Sep 2023 01:15)  T(F): 98.3 (03 Sep 2023 06:52), Max: 98.6 (03 Sep 2023 01:15)  HR: 62 (03 Sep 2023 06:52) (58 - 92)  BP: 130/81 (03 Sep 2023 06:52) (100/58 - 148/66)  RR: 18 (03 Sep 2023 06:52) (16 - 18)  SpO2: 98% (03 Sep 2023 01:03) (79% - 100%)    Parameters below as of 03 Sep 2023 06:52  Patient On (Oxygen Delivery Method): room air      Gen: AAOx3, NAD  Fundus: firm, below umbilicus   Abd: Soft, nontender, nondistended, BS+  Lochia: Minimal   Ext: No calf tenderness, no swelling    Labs:                        11.3   10.71 )-----------( 150      ( 02 Sep 2023 20:10 )             33.3                         12.2   13.49 )-----------( 147      ( 02 Sep 2023 12:17 )             35.7

## 2023-09-03 NOTE — PROGRESS NOTE ADULT - ASSESSMENT
A/P: 32y now P2 s/p VAVD with preeclampsia with severe features s/p magnesium for seizure prophylaxis, PPD#2.     - encourage ambulation, PO hydration  - monitor vitals, bleeding  - VNS for bp monitoring ordered   - routine postpartum course    Dr. Wilson and Dr. Arnett to be made aware.  A/P: 32y now P2 s/p VAVD with preeclampsia with severe features s/p magnesium for seizure prophylaxis, PPD#2, recovering well.     - encourage ambulation, PO hydration  - monitor vitals, bleeding  - VNS for BP monitoring ordered  --> pt declining, reports she has a BP cuff at home and will monitor herself  - routine postpartum course    Dr. Wilson and Dr. Arnett to be made aware.

## 2023-09-04 ENCOUNTER — TRANSCRIPTION ENCOUNTER (OUTPATIENT)
Age: 33
End: 2023-09-04

## 2023-09-04 VITALS
SYSTOLIC BLOOD PRESSURE: 126 MMHG | TEMPERATURE: 98 F | RESPIRATION RATE: 18 BRPM | DIASTOLIC BLOOD PRESSURE: 81 MMHG | HEART RATE: 80 BPM

## 2023-09-04 PROCEDURE — 99238 HOSP IP/OBS DSCHRG MGMT 30/<: CPT

## 2023-09-04 RX ORDER — ACETAMINOPHEN 500 MG
3 TABLET ORAL
Qty: 0 | Refills: 0 | DISCHARGE
Start: 2023-09-04

## 2023-09-04 RX ORDER — SENNA PLUS 8.6 MG/1
2 TABLET ORAL
Qty: 0 | Refills: 0 | DISCHARGE
Start: 2023-09-04

## 2023-09-04 RX ORDER — SIMETHICONE 80 MG/1
1 TABLET, CHEWABLE ORAL
Qty: 0 | Refills: 0 | DISCHARGE
Start: 2023-09-04

## 2023-09-04 RX ORDER — IBUPROFEN 200 MG
1 TABLET ORAL
Qty: 0 | Refills: 0 | DISCHARGE
Start: 2023-09-04

## 2023-09-04 RX ADMIN — Medication 975 MILLIGRAM(S): at 12:31

## 2023-09-04 RX ADMIN — Medication 1 TABLET(S): at 11:55

## 2023-09-04 RX ADMIN — Medication 600 MILLIGRAM(S): at 08:50

## 2023-09-04 RX ADMIN — Medication 975 MILLIGRAM(S): at 06:26

## 2023-09-04 RX ADMIN — Medication 975 MILLIGRAM(S): at 11:55

## 2023-09-04 RX ADMIN — Medication 600 MILLIGRAM(S): at 08:18

## 2023-09-04 RX ADMIN — SODIUM CHLORIDE 3 MILLILITER(S): 9 INJECTION INTRAMUSCULAR; INTRAVENOUS; SUBCUTANEOUS at 05:29

## 2023-09-04 RX ADMIN — Medication 975 MILLIGRAM(S): at 06:56

## 2023-09-04 RX ADMIN — SENNA PLUS 2 TABLET(S): 8.6 TABLET ORAL at 11:55

## 2023-09-04 NOTE — DISCHARGE NOTE OB - ADDITIONAL INSTRUCTIONS
No heavy lifting.   Nothing inside the vagina for 6 weeks: no tampons, douching, sexual intercourse, tub baths or pools.   If you have a fever over 100.4F, severe abdominal pain or heavy vaginal bleeding please call your doctor or go to the emergency room.  Please follow up with your OBGYN in 1 week for blood pressure check and 6 weeks for a postpartum visit.    Please monitor your blood pressures at home. If you have headache, blurry vision, chest pain, difficulty breathing, or severe abdominal pain, call the doctor or return to the hospital.

## 2023-09-04 NOTE — DISCHARGE NOTE OB - NS MD DC FALL RISK RISK
For information on Fall & Injury Prevention, visit: [email or web address] OR  [email or web address] OR  https://www.cdc.gov/steadi/patient.html

## 2023-09-04 NOTE — DISCHARGE NOTE OB - HOSPITAL COURSE
s/p vacuum assisted vaginal delivery, preeclampsia w/ severe features, s/p magnesium, vitals stable upon discharge

## 2023-09-04 NOTE — PROGRESS NOTE ADULT - ASSESSMENT
A/P: 32y now P2 s/p VAVD with preeclampsia with severe features s/p magnesium for seizure prophylaxis, PPD#3, recovering well.     - encourage ambulation, PO hydration  - monitor vitals, bleeding  - VNS for BP monitoring ordered  --> pt declining, reports she has a BP cuff at home and will monitor herself  - routine postpartum course    Dr. Son and Dr. Boogie to be made aware.

## 2023-09-04 NOTE — PROGRESS NOTE ADULT - SUBJECTIVE AND OBJECTIVE BOX
PGY 1 Note:    Pt doing well, pain well controlled. Reports mild neck pain/posterior headache, took tylenol this am. Denies heavy vaginal bleeding. No overnight events, no acute complaints. Ambulating without difficulty, voiding, and tolerating regular diet. Breastfeeding. Baby in NICU, desires to go home today since baby won't by d/luciana for some tome.     PAST MEDICAL & SURGICAL HISTORY:  No pertinent past medical history  No significant past surgical history    Physical Exam  Vital Signs Last 24 Hrs  T(F): 99.2 (03 Sep 2023 23:32), Max: 99.3 (03 Sep 2023 19:26)  HR: 94 (04 Sep 2023 04:00) (64 - 94)  BP: 120/76 (04 Sep 2023 04:00) (120/76 - 134/86)  RR: 16 (04 Sep 2023 04:00) (16 - 18)    Gen: AAOx3, NAD  Abd: Soft, nontender, nondistended  Fundus: Firm, nontender, below the umbilicus  Lochia: Minimal  Ext: No calf tenderness, no swelling    Labs:                        11.3   10.71 )-----------( 150      ( 02 Sep 2023 20:10 )             33.3                         12.2   13.49 )-----------( 147      ( 02 Sep 2023 12:17 )             35.7    PGY 1 Note:    Pt doing well, pain well controlled. Reports mild neck pain/posterior headache, took tylenol this am. Denies heavy vaginal bleeding. No overnight events, no acute complaints. Ambulating without difficulty, voiding, and tolerating regular diet. Breastfeeding. Baby in NICU,    PAST MEDICAL & SURGICAL HISTORY:  No pertinent past medical history  No significant past surgical history    Physical Exam  Vital Signs Last 24 Hrs  T(F): 99.2 (03 Sep 2023 23:32), Max: 99.3 (03 Sep 2023 19:26)  HR: 94 (04 Sep 2023 04:00) (64 - 94)  BP: 120/76 (04 Sep 2023 04:00) (120/76 - 134/86)  RR: 16 (04 Sep 2023 04:00) (16 - 18)    Gen: AAOx3, NAD  Abd: Soft, nontender, nondistended  Fundus: Firm, nontender, below the umbilicus  Lochia: Minimal  Ext: No calf tenderness, no swelling    Labs:                        11.3   10.71 )-----------( 150      ( 02 Sep 2023 20:10 )             33.3                         12.2   13.49 )-----------( 147      ( 02 Sep 2023 12:17 )             35.7

## 2023-09-04 NOTE — DISCHARGE NOTE OB - PATIENT PORTAL LINK FT
You can access the FollowMyHealth Patient Portal offered by [facility] by registering at the following website: [email or web address]. By joining [employer]’s FollowMyHealth portal, you will also be able to view your health information using other applications (apps) compatible with our system.

## 2023-09-04 NOTE — DISCHARGE NOTE OB - CARE PLAN
Principal Discharge DX:	Vacuum extractor delivery  Assessment and plan of treatment:	s/p vacuum assisted vaginal delivery   1

## 2023-09-04 NOTE — DISCHARGE NOTE OB - CARE PROVIDER_API CALL
Carrol Arrington  Obstetrics and Gynecology  [address] [place] [id number]  Phone: [phone]  Fax: [phone]  Established Patient  Follow Up Time:

## 2023-09-06 DIAGNOSIS — Z34.83 ENCOUNTER FOR SUPERVISION OF OTHER NORMAL PREGNANCY, THIRD TRIMESTER: ICD-10-CM

## 2023-09-07 LAB
BILIRUB UR QL STRIP: NORMAL
GLUCOSE UR-MCNC: NORMAL
HCG UR QL: NORMAL EU/DL
HGB UR QL STRIP.AUTO: NORMAL
KETONES UR-MCNC: NORMAL
LEUKOCYTE ESTERASE UR QL STRIP: NORMAL
NITRITE UR QL STRIP: NORMAL
PH UR STRIP: 5.5
PROT UR STRIP-MCNC: NORMAL
SP GR UR STRIP: 1.01

## 2023-09-11 DIAGNOSIS — N76.0 ACUTE VAGINITIS: ICD-10-CM

## 2023-09-11 DIAGNOSIS — Z3A.39 39 WEEKS GESTATION OF PREGNANCY: ICD-10-CM

## 2023-09-11 DIAGNOSIS — O23.593 INFECTION OF OTHER PART OF GENITAL TRACT IN PREGNANCY, THIRD TRIMESTER: ICD-10-CM

## 2023-10-13 ENCOUNTER — APPOINTMENT (OUTPATIENT)
Dept: OBGYN | Facility: CLINIC | Age: 33
End: 2023-10-13

## 2024-04-04 ENCOUNTER — NON-APPOINTMENT (OUTPATIENT)
Age: 34
End: 2024-04-04

## 2024-04-05 NOTE — ED PROVIDER NOTE - IV ALTEPLASE INCLUSION HIDDEN
[Flexion] : flexion [Extension] : extension [[id number]] : forward flexion 30 degrees [de-identified] : extension 30 degrees show [Left] : left shoulder [] : no tenderness to palpation [NL (0-180)] : full active abduction 0-180 degrees [NL (0-70)] : full internal rotation 0-70 degrees [Standing] : standing [5 ___] : forward flexion 5[unfilled]/5 [5___] : internal rotation 5[unfilled]/5 [de-identified] : external rotation 80 degrees

## 2025-03-07 ENCOUNTER — APPOINTMENT (OUTPATIENT)
Dept: INTERNAL MEDICINE | Facility: CLINIC | Age: 35
End: 2025-03-07

## 2025-03-07 ENCOUNTER — OUTPATIENT (OUTPATIENT)
Dept: OUTPATIENT SERVICES | Facility: HOSPITAL | Age: 35
LOS: 1 days | End: 2025-03-07
Payer: COMMERCIAL

## 2025-03-07 VITALS
WEIGHT: 120 LBS | DIASTOLIC BLOOD PRESSURE: 83 MMHG | SYSTOLIC BLOOD PRESSURE: 120 MMHG | HEIGHT: 66 IN | OXYGEN SATURATION: 100 % | HEART RATE: 81 BPM | TEMPERATURE: 98 F | BODY MASS INDEX: 19.29 KG/M2

## 2025-03-07 DIAGNOSIS — Z78.9 OTHER SPECIFIED HEALTH STATUS: ICD-10-CM

## 2025-03-07 DIAGNOSIS — Z00.00 ENCOUNTER FOR GENERAL ADULT MEDICAL EXAMINATION WITHOUT ABNORMAL FINDINGS: ICD-10-CM

## 2025-03-07 DIAGNOSIS — Z00.00 ENCOUNTER FOR GENERAL ADULT MEDICAL EXAMINATION W/OUT ABNORMAL FINDINGS: ICD-10-CM

## 2025-03-07 PROCEDURE — 80053 COMPREHEN METABOLIC PANEL: CPT

## 2025-03-07 PROCEDURE — 80061 LIPID PANEL: CPT

## 2025-03-07 PROCEDURE — 99204 OFFICE O/P NEW MOD 45 MIN: CPT

## 2025-03-07 PROCEDURE — 83036 HEMOGLOBIN GLYCOSYLATED A1C: CPT

## 2025-03-07 PROCEDURE — 84443 ASSAY THYROID STIM HORMONE: CPT

## 2025-03-07 PROCEDURE — 85025 COMPLETE CBC W/AUTO DIFF WBC: CPT

## 2025-03-07 PROCEDURE — 81003 URINALYSIS AUTO W/O SCOPE: CPT

## 2025-03-13 DIAGNOSIS — Z78.9 OTHER SPECIFIED HEALTH STATUS: ICD-10-CM

## 2025-03-14 LAB
ALBUMIN SERPL ELPH-MCNC: 4.7 G/DL
ALP BLD-CCNC: 58 U/L
ALT SERPL-CCNC: 19 U/L
ANION GAP SERPL CALC-SCNC: 11 MMOL/L
AST SERPL-CCNC: 23 U/L
BASOPHILS # BLD AUTO: 0.02 K/UL
BASOPHILS NFR BLD AUTO: 0.4 %
BILIRUB SERPL-MCNC: 0.3 MG/DL
BUN SERPL-MCNC: 14 MG/DL
CALCIUM SERPL-MCNC: 9.8 MG/DL
CHLORIDE SERPL-SCNC: 103 MMOL/L
CHOLEST SERPL-MCNC: 178 MG/DL
CO2 SERPL-SCNC: 26 MMOL/L
CREAT SERPL-MCNC: 0.7 MG/DL
EGFRCR SERPLBLD CKD-EPI 2021: 118 ML/MIN/1.73M2
EOSINOPHIL # BLD AUTO: 0.12 K/UL
EOSINOPHIL NFR BLD AUTO: 2.4 %
ESTIMATED AVERAGE GLUCOSE: 111 MG/DL
GLUCOSE SERPL-MCNC: 99 MG/DL
HBA1C MFR BLD HPLC: 5.5 %
HCT VFR BLD CALC: 38 %
HDLC SERPL-MCNC: 71 MG/DL
HGB BLD-MCNC: 12.2 G/DL
IMM GRANULOCYTES NFR BLD AUTO: 0.2 %
LDLC SERPL CALC-MCNC: 95 MG/DL
LYMPHOCYTES # BLD AUTO: 1.96 K/UL
LYMPHOCYTES NFR BLD AUTO: 39 %
MAN DIFF?: NORMAL
MCHC RBC-ENTMCNC: 28.5 PG
MCHC RBC-ENTMCNC: 32.1 G/DL
MCV RBC AUTO: 88.8 FL
MONOCYTES # BLD AUTO: 0.31 K/UL
MONOCYTES NFR BLD AUTO: 6.2 %
NEUTROPHILS # BLD AUTO: 2.61 K/UL
NEUTROPHILS NFR BLD AUTO: 51.8 %
NONHDLC SERPL-MCNC: 107 MG/DL
PLATELET # BLD AUTO: 242 K/UL
PMV BLD AUTO: 0 /100 WBCS
PMV BLD: 10.9 FL
POTASSIUM SERPL-SCNC: 4.2 MMOL/L
PROT SERPL-MCNC: 8.6 G/DL
RBC # BLD: 4.28 M/UL
RBC # FLD: 14.6 %
SODIUM SERPL-SCNC: 140 MMOL/L
TRIGL SERPL-MCNC: 62 MG/DL
TSH SERPL-ACNC: 2.53 UIU/ML
WBC # FLD AUTO: 5.03 K/UL

## 2025-04-11 ENCOUNTER — APPOINTMENT (OUTPATIENT)
Dept: INTERNAL MEDICINE | Facility: CLINIC | Age: 35
End: 2025-04-11